# Patient Record
Sex: MALE | Race: WHITE | NOT HISPANIC OR LATINO | Employment: STUDENT | URBAN - METROPOLITAN AREA
[De-identification: names, ages, dates, MRNs, and addresses within clinical notes are randomized per-mention and may not be internally consistent; named-entity substitution may affect disease eponyms.]

---

## 2023-04-21 ENCOUNTER — HOSPITAL ENCOUNTER (EMERGENCY)
Age: 23
Discharge: HOME | End: 2023-04-22
Attending: EMERGENCY MEDICINE

## 2023-04-21 ENCOUNTER — APPOINTMENT (OUTPATIENT)
Dept: GENERAL RADIOLOGY | Age: 23
End: 2023-04-21
Attending: EMERGENCY MEDICINE

## 2023-04-21 ENCOUNTER — APPOINTMENT (OUTPATIENT)
Dept: CT IMAGING | Age: 23
End: 2023-04-21
Attending: EMERGENCY MEDICINE

## 2023-04-21 DIAGNOSIS — R55 SYNCOPE AND COLLAPSE: Primary | ICD-10-CM

## 2023-04-21 LAB
ALBUMIN SERPL-MCNC: 4.1 G/DL (ref 3.6–5.1)
ALBUMIN/GLOB SERPL: 1.6 {RATIO} (ref 1–2.4)
ALP SERPL-CCNC: 47 UNITS/L (ref 45–117)
ALT SERPL-CCNC: 16 UNITS/L
ANION GAP SERPL CALC-SCNC: 7 MMOL/L (ref 7–19)
AST SERPL-CCNC: 15 UNITS/L
BASOPHILS # BLD: 0 K/MCL (ref 0–0.3)
BASOPHILS NFR BLD: 0 %
BILIRUB SERPL-MCNC: 0.9 MG/DL (ref 0.2–1)
BUN SERPL-MCNC: 5 MG/DL (ref 6–20)
BUN/CREAT SERPL: 5 (ref 7–25)
CALCIUM SERPL-MCNC: 8.9 MG/DL (ref 8.4–10.2)
CHLORIDE SERPL-SCNC: 107 MMOL/L (ref 97–110)
CO2 SERPL-SCNC: 26 MMOL/L (ref 21–32)
CREAT SERPL-MCNC: 1.03 MG/DL (ref 0.67–1.17)
DEPRECATED RDW RBC: 35.6 FL (ref 39–50)
EOSINOPHIL # BLD: 0.1 K/MCL (ref 0–0.5)
EOSINOPHIL NFR BLD: 2 %
ERYTHROCYTE [DISTWIDTH] IN BLOOD: 11.8 % (ref 11–15)
ETHANOL SERPL-MCNC: NORMAL MG/DL
FASTING DURATION TIME PATIENT: ABNORMAL H
GFR SERPLBLD BASED ON 1.73 SQ M-ARVRAT: >90 ML/MIN
GLOBULIN SER-MCNC: 2.5 G/DL (ref 2–4)
GLUCOSE SERPL-MCNC: 115 MG/DL (ref 70–99)
HCT VFR BLD CALC: 44.8 % (ref 39–51)
HGB BLD-MCNC: 15.8 G/DL (ref 13–17)
IMM GRANULOCYTES # BLD AUTO: 0 K/MCL (ref 0–0.2)
IMM GRANULOCYTES # BLD: 0 %
LYMPHOCYTES # BLD: 2.4 K/MCL (ref 1–4.8)
LYMPHOCYTES NFR BLD: 41 %
MAGNESIUM SERPL-MCNC: 1.9 MG/DL (ref 1.7–2.4)
MCH RBC QN AUTO: 29.4 PG (ref 26–34)
MCHC RBC AUTO-ENTMCNC: 35.3 G/DL (ref 32–36.5)
MCV RBC AUTO: 83.3 FL (ref 78–100)
MONOCYTES # BLD: 0.5 K/MCL (ref 0.3–0.9)
MONOCYTES NFR BLD: 8 %
NEUTROPHILS # BLD: 2.8 K/MCL (ref 1.8–7.7)
NEUTROPHILS NFR BLD: 49 %
NRBC BLD MANUAL-RTO: 0 /100 WBC
PLATELET # BLD AUTO: 166 K/MCL (ref 140–450)
POTASSIUM SERPL-SCNC: 3.2 MMOL/L (ref 3.4–5.1)
PROT SERPL-MCNC: 6.6 G/DL (ref 6.4–8.2)
RBC # BLD: 5.38 MIL/MCL (ref 4.5–5.9)
SODIUM SERPL-SCNC: 137 MMOL/L (ref 135–145)
TROPONIN I SERPL DL<=0.01 NG/ML-MCNC: 6 NG/L
WBC # BLD: 5.8 K/MCL (ref 4.2–11)

## 2023-04-21 PROCEDURE — 72125 CT NECK SPINE W/O DYE: CPT

## 2023-04-21 PROCEDURE — 93005 ELECTROCARDIOGRAM TRACING: CPT | Performed by: EMERGENCY MEDICINE

## 2023-04-21 PROCEDURE — 70450 CT HEAD/BRAIN W/O DYE: CPT

## 2023-04-21 PROCEDURE — 84484 ASSAY OF TROPONIN QUANT: CPT | Performed by: EMERGENCY MEDICINE

## 2023-04-21 PROCEDURE — 80053 COMPREHEN METABOLIC PANEL: CPT | Performed by: EMERGENCY MEDICINE

## 2023-04-21 PROCEDURE — 99285 EMERGENCY DEPT VISIT HI MDM: CPT

## 2023-04-21 PROCEDURE — 96361 HYDRATE IV INFUSION ADD-ON: CPT

## 2023-04-21 PROCEDURE — 83735 ASSAY OF MAGNESIUM: CPT | Performed by: EMERGENCY MEDICINE

## 2023-04-21 PROCEDURE — G1004 CDSM NDSC: HCPCS

## 2023-04-21 PROCEDURE — 96374 THER/PROPH/DIAG INJ IV PUSH: CPT

## 2023-04-21 PROCEDURE — 10002807 HB RX 258: Performed by: EMERGENCY MEDICINE

## 2023-04-21 PROCEDURE — 10002800 HB RX 250 W HCPCS: Performed by: EMERGENCY MEDICINE

## 2023-04-21 PROCEDURE — 85025 COMPLETE CBC W/AUTO DIFF WBC: CPT | Performed by: EMERGENCY MEDICINE

## 2023-04-21 PROCEDURE — 82077 ASSAY SPEC XCP UR&BREATH IA: CPT | Performed by: EMERGENCY MEDICINE

## 2023-04-21 RX ORDER — POTASSIUM CHLORIDE 20 MEQ/1
20 TABLET, EXTENDED RELEASE ORAL ONCE
Status: COMPLETED | OUTPATIENT
Start: 2023-04-21 | End: 2023-04-22

## 2023-04-21 RX ORDER — ONDANSETRON 2 MG/ML
4 INJECTION INTRAMUSCULAR; INTRAVENOUS ONCE
Status: COMPLETED | OUTPATIENT
Start: 2023-04-21 | End: 2023-04-21

## 2023-04-21 RX ADMIN — SODIUM CHLORIDE, POTASSIUM CHLORIDE, SODIUM LACTATE AND CALCIUM CHLORIDE 1000 ML: 600; 310; 30; 20 INJECTION, SOLUTION INTRAVENOUS at 23:21

## 2023-04-21 RX ADMIN — ONDANSETRON 4 MG: 2 INJECTION INTRAMUSCULAR; INTRAVENOUS at 22:04

## 2023-04-21 ASSESSMENT — PAIN SCALES - GENERAL: PAINLEVEL_OUTOF10: 4

## 2023-04-21 ASSESSMENT — PAIN DESCRIPTION - PAIN TYPE: TYPE: ACUTE PAIN

## 2023-04-22 ENCOUNTER — APPOINTMENT (OUTPATIENT)
Dept: GENERAL RADIOLOGY | Age: 23
End: 2023-04-22
Attending: EMERGENCY MEDICINE

## 2023-04-22 VITALS
HEART RATE: 51 BPM | TEMPERATURE: 98.3 F | SYSTOLIC BLOOD PRESSURE: 108 MMHG | HEIGHT: 76 IN | RESPIRATION RATE: 18 BRPM | DIASTOLIC BLOOD PRESSURE: 57 MMHG | OXYGEN SATURATION: 96 % | WEIGHT: 189.6 LBS | BODY MASS INDEX: 23.09 KG/M2

## 2023-04-22 LAB
ATRIAL RATE (BPM): 64
P AXIS (DEGREES): 29
PR-INTERVAL (MSEC): 142
QRS-INTERVAL (MSEC): 124
QT-INTERVAL (MSEC): 380
QTC: 392
R AXIS (DEGREES): 56
RAINBOW EXTRA TUBES HOLD SPECIMEN: NORMAL
REPORT TEXT: NORMAL
T AXIS (DEGREES): 57
VENTRICULAR RATE EKG/MIN (BPM): 64

## 2023-04-22 PROCEDURE — 10002803 HB RX 637: Performed by: EMERGENCY MEDICINE

## 2023-04-22 PROCEDURE — 71045 X-RAY EXAM CHEST 1 VIEW: CPT

## 2023-04-22 RX ADMIN — POTASSIUM CHLORIDE 20 MEQ: 1500 TABLET, EXTENDED RELEASE ORAL at 00:19

## 2025-01-02 ENCOUNTER — HOSPITAL ENCOUNTER (EMERGENCY)
Age: 25
Discharge: HOME | End: 2025-01-02
Attending: EMERGENCY MEDICINE
Payer: MEDICAID

## 2025-01-02 VITALS
SYSTOLIC BLOOD PRESSURE: 134 MMHG | OXYGEN SATURATION: 99 % | HEIGHT: 74 IN | HEART RATE: 102 BPM | DIASTOLIC BLOOD PRESSURE: 89 MMHG | RESPIRATION RATE: 16 BRPM | BODY MASS INDEX: 24.38 KG/M2 | TEMPERATURE: 99 F | WEIGHT: 190 LBS

## 2025-01-02 DIAGNOSIS — R11.2 NAUSEA AND VOMITING, UNSPECIFIED VOMITING TYPE: Primary | ICD-10-CM

## 2025-01-02 LAB
POCT INFLUENZA A: NEGATIVE
POCT INFLUENZA B: NEGATIVE
SARS-COV-2 RNA RESP QL NAA+PROBE: NOT DETECTED

## 2025-01-02 PROCEDURE — 99284 EMERGENCY DEPT VISIT MOD MDM: CPT

## 2025-01-02 PROCEDURE — 87502 INFLUENZA DNA AMP PROBE: CPT | Performed by: EMERGENCY MEDICINE

## 2025-01-02 PROCEDURE — 99283 EMERGENCY DEPT VISIT LOW MDM: CPT

## 2025-01-02 RX ORDER — ACETAMINOPHEN 500 MG
1000 TABLET ORAL ONCE
Status: COMPLETED | OUTPATIENT
Start: 2025-01-02 | End: 2025-01-02

## 2025-01-02 RX ORDER — ONDANSETRON 4 MG/1
4 TABLET, ORALLY DISINTEGRATING ORAL EVERY 4 HOURS PRN
Qty: 7 TABLET | Refills: 0 | Status: SHIPPED | OUTPATIENT
Start: 2025-01-02

## 2025-01-02 RX ORDER — ONDANSETRON 4 MG/1
4 TABLET, ORALLY DISINTEGRATING ORAL ONCE
Status: COMPLETED | OUTPATIENT
Start: 2025-01-02 | End: 2025-01-02

## 2025-01-02 NOTE — ED PROVIDER NOTES
Patient Seen in: [facility] [place]      History     Chief Complaint   Patient presents with    Nausea/vomiting     Stated Complaint: woke up this morning with nausea/vomiting. around sick contacts    Subjective:   HPI  Patient is a 24-year-old male otherwise healthy who presents to the ED for evaluation of nausea and vomiting starting this morning around 5 AM.  Patient reports 4 episodes of NBNB emesis and some mild epigastric discomfort.  Patient states that he has had congestion as well.  Reports subjective fever but did not take his temperature.  Did not take any medicines prior to arrival.  Denies any cough, chest pain or SOB, diarrhea, constipation, urinary symptoms.  Patient's last bowel movement was yesterday and was normal.  Patient is accompanied by his sister who tested positive for influenza A.      Objective:     History reviewed. No pertinent past medical history.           History reviewed. No pertinent surgical history.             Social History     Socioeconomic History    Marital status: Single    Number of children: 0   Occupational History    Occupation: /   Tobacco Use    Smoking status: Former     Current packs/day: 0.00     Average packs/day: 1 pack/day for 5.0 years (5.0 ttl pk-yrs)     Types: Cigarettes     Start date: 10/6/2014     Quit date: 10/6/2019     Years since quittin.2    Smokeless tobacco: Never   Vaping Use    Vaping status: Every Day    Substances: Nicotine    Devices: Pre-filled or refillable cartridge, Refillable tank, Pre-filled pod   Substance and Sexual Activity    Alcohol use: Never     Alcohol/week: 0.0 standard drinks of alcohol    Drug use: Never                  Physical Exam     ED Triage Vitals [25 0744]   /79   Pulse 119   Resp 18   Temp 99.4 °F (37.4 °C)   Temp src Temporal   SpO2 97 %   O2 Device None (Room air)       Current Vitals:   Vital Signs  BP: 134/89  Pulse: 102  Resp: 16  Temp: 99.4 °F (37.4  °C)  Temp src: Temporal    Oxygen Therapy  SpO2: 99 %  O2 Device: None (Room air)        Physical Exam  Vitals and nursing note reviewed.   Constitutional:       General: He is not in acute distress.     Appearance: He is not ill-appearing.   HENT:      Head: Normocephalic and atraumatic.      Nose: Nose normal.      Mouth/Throat:      Mouth: Mucous membranes are moist.   Eyes:      Extraocular Movements: Extraocular movements intact.      Pupils: Pupils are equal, round, and reactive to light.   Cardiovascular:      Rate and Rhythm: Normal rate and regular rhythm.      Pulses: Normal pulses.   Pulmonary:      Effort: Pulmonary effort is normal. No respiratory distress.      Breath sounds: No wheezing.   Abdominal:      General: There is no distension.      Palpations: Abdomen is soft.      Tenderness: There is abdominal tenderness.      Comments: Mild epigastric TTP   Musculoskeletal:         General: No swelling. Normal range of motion.      Cervical back: Normal range of motion.   Skin:     General: Skin is warm and dry.      Capillary Refill: Capillary refill takes less than 2 seconds.   Neurological:      General: No focal deficit present.      Mental Status: He is alert.   Psychiatric:         Mood and Affect: Mood normal.           ED Course     Labs Reviewed   RAPID SARS-COV-2 BY PCR - Normal   POCT FLU TEST - Normal    Narrative:     This assay is a rapid molecular in vitro test utilizing nucleic acid amplification of influenza A and B viral RNA.       ED Course as of 01/02/25 1718  ------------------------------------------------------------  Time: 01/02 0819  Comment: Covid, flu negative. Pt re-evaluated. VSS. Feeling better and tolerating PO. Patient is stable for discharge home with close PCP f/u. Discussed strict return precautions and supportive care. Patient verbalized understanding and agreement of plan.   Prescribed zofran.             MDM      Patient is a 24-year-old male otherwise healthy who  presents to the ED for evaluation of nausea and vomiting starting this morning around 5 AM.  Patient's temp is 99.4, he is mildly tachycardic to 119, satting well on room air.  On exam patient is nontoxic-appearing.  He appears mildly dehydrated.  He has mild epigastric tenderness but no peritoneal signs.  Suspect most likely gastroenteritis or viral syndrome with history and presentation. Low suspicion for SBO, appendicitis, UTI/pyelo, pancreatitis, GB etiology or bacterial infection with history, presentation exam at this time.  Will plan to p.o. challenge after Zofran and give Tylenol.  Will obtain COVID and flu swabs.    ED Course as of 01/02/25 1718  ------------------------------------------------------------  Time: 01/02 0819  Comment: Covid, flu negative. Pt re-evaluated. VSS. Feeling better and tolerating PO. Patient is stable for discharge home with close PCP f/u. Discussed strict return precautions and supportive care. Patient verbalized understanding and agreement of plan.   Prescribed zofran.              Medical Decision Making      Disposition and Plan     Clinical Impression:  1. Nausea and vomiting, unspecified vomiting type         Disposition:  Discharge  1/2/2025  9:31 am    Follow-up:  Freddy Hernández MD  [address]   [address]  [facility] [address]  [phone]    Schedule an appointment as soon as possible for a visit in 1 day(s)            Medications Prescribed:  Discharge Medication List as of 1/2/2025  9:31 AM        START taking these medications    Details   ondansetron 4 MG Oral Tablet Dispersible Take 1 tablet (4 mg total) by mouth every 4 (four) hours as needed for Nausea., Normal, Disp-7 tablet, R-0                 Supplementary Documentation:

## 2025-01-02 NOTE — DISCHARGE INSTRUCTIONS
You were seen in the emergency department.  He tested negative for COVID and for flu.  Take Zofran as needed every 8 hours for vomiting or nausea.  You can also take Tylenol and ibuprofen as needed for fevers or pain.  Return to the ER if you develop persistent fevers, worsening pain, if you are vomiting and unable to keep anything down, bloody vomit or any other new concerns or worsening symptoms.  Please follow closely with your primary care physician for reevaluation.  Stay hydrated with plenty of fluids.

## 2025-01-02 NOTE — ED INITIAL ASSESSMENT (HPI)
Pt to ed with c/o n/v and fever this AM. PT reports approx 4 episodes of vomiting. Denies diarrhea

## 2025-06-25 ENCOUNTER — APPOINTMENT (OUTPATIENT)
Dept: CT IMAGING | Age: 25
End: 2025-06-25
Attending: EMERGENCY MEDICINE
Payer: MEDICAID

## 2025-06-25 ENCOUNTER — HOSPITAL ENCOUNTER (OUTPATIENT)
Facility: HOSPITAL | Age: 25
Setting detail: OBSERVATION
Discharge: HOME | End: 2025-06-27
Attending: EMERGENCY MEDICINE | Admitting: STUDENT IN AN ORGANIZED HEALTH CARE EDUCATION/TRAINING PROGRAM
Payer: MEDICAID

## 2025-06-25 DIAGNOSIS — K35.890 OTHER ACUTE APPENDICITIS: Primary | ICD-10-CM

## 2025-06-25 DIAGNOSIS — K35.80 ACUTE APPENDICITIS: ICD-10-CM

## 2025-06-25 DIAGNOSIS — G89.18 POSTOPERATIVE PAIN: ICD-10-CM

## 2025-06-25 LAB
ALBUMIN SERPL-MCNC: 4.5 G/DL (ref 3.2–4.8)
ALBUMIN/GLOB SERPL: 2 {RATIO} (ref 1–2)
ALP LIVER SERPL-CCNC: 52 U/L (ref 45–117)
ALT SERPL-CCNC: 28 U/L (ref 10–49)
ANION GAP SERPL CALC-SCNC: 8 MMOL/L (ref 0–18)
AST SERPL-CCNC: 14 U/L (ref ?–34)
BASOPHILS # BLD AUTO: 0.02 X10(3) UL (ref 0–0.2)
BASOPHILS NFR BLD AUTO: 0.3 %
BILIRUB SERPL-MCNC: 0.6 MG/DL (ref 0.3–1.2)
BILIRUB UR QL STRIP.AUTO: NEGATIVE
BUN BLD-MCNC: 9 MG/DL (ref 9–23)
CALCIUM BLD-MCNC: 9.3 MG/DL (ref 8.7–10.6)
CHLORIDE SERPL-SCNC: 103 MMOL/L (ref 98–112)
CLARITY UR REFRACT.AUTO: CLEAR
CO2 SERPL-SCNC: 28 MMOL/L (ref 21–32)
COLOR UR AUTO: YELLOW
CREAT BLD-MCNC: 1.12 MG/DL (ref 0.7–1.3)
EGFRCR SERPLBLD CKD-EPI 2021: 93 ML/MIN/1.73M2 (ref 60–?)
EOSINOPHIL # BLD AUTO: 0.1 X10(3) UL (ref 0–0.7)
EOSINOPHIL NFR BLD AUTO: 1.6 %
ERYTHROCYTE [DISTWIDTH] IN BLOOD BY AUTOMATED COUNT: 12.3 %
GLOBULIN PLAS-MCNC: 2.3 G/DL (ref 2–3.5)
GLUCOSE BLD-MCNC: 100 MG/DL (ref 70–99)
GLUCOSE UR STRIP.AUTO-MCNC: NEGATIVE MG/DL
HCT VFR BLD AUTO: 47.1 % (ref 39–53)
HGB BLD-MCNC: 16.5 G/DL (ref 13–17.5)
IMM GRANULOCYTES # BLD AUTO: 0.02 X10(3) UL (ref 0–1)
IMM GRANULOCYTES NFR BLD: 0.3 %
KETONES UR STRIP.AUTO-MCNC: NEGATIVE MG/DL
LEUKOCYTE ESTERASE UR QL STRIP.AUTO: NEGATIVE
LIPASE SERPL-CCNC: 25 U/L (ref 12–53)
LYMPHOCYTES # BLD AUTO: 1.4 X10(3) UL (ref 1–4)
LYMPHOCYTES NFR BLD AUTO: 22.9 %
MCH RBC QN AUTO: 29.7 PG (ref 26–34)
MCHC RBC AUTO-ENTMCNC: 35 G/DL (ref 31–37)
MCV RBC AUTO: 84.9 FL (ref 80–100)
MONOCYTES # BLD AUTO: 0.5 X10(3) UL (ref 0.1–1)
MONOCYTES NFR BLD AUTO: 8.2 %
NEUTROPHILS # BLD AUTO: 4.08 X10 (3) UL (ref 1.5–7.7)
NEUTROPHILS # BLD AUTO: 4.08 X10(3) UL (ref 1.5–7.7)
NEUTROPHILS NFR BLD AUTO: 66.7 %
NITRITE UR QL STRIP.AUTO: NEGATIVE
OSMOLALITY SERPL CALC.SUM OF ELEC: 287 MOSM/KG (ref 275–295)
PH UR STRIP.AUTO: 5.5 [PH] (ref 5–8)
PLATELET # BLD AUTO: 183 10(3)UL (ref 150–450)
POTASSIUM SERPL-SCNC: 3.5 MMOL/L (ref 3.5–5.1)
PROT SERPL-MCNC: 6.8 G/DL (ref 5.7–8.2)
PROT UR STRIP.AUTO-MCNC: NEGATIVE MG/DL
RBC # BLD AUTO: 5.55 X10(6)UL (ref 4.3–5.7)
RBC UR QL AUTO: NEGATIVE
SODIUM SERPL-SCNC: 139 MMOL/L (ref 136–145)
SP GR UR STRIP.AUTO: 1.01 (ref 1–1.03)
UROBILINOGEN UR STRIP.AUTO-MCNC: 0.2 MG/DL
WBC # BLD AUTO: 6.1 X10(3) UL (ref 4–11)

## 2025-06-25 PROCEDURE — 74177 CT ABD & PELVIS W/CONTRAST: CPT | Performed by: EMERGENCY MEDICINE

## 2025-06-25 RX ORDER — ONDANSETRON 2 MG/ML
4 INJECTION INTRAMUSCULAR; INTRAVENOUS ONCE
Status: COMPLETED | OUTPATIENT
Start: 2025-06-25 | End: 2025-06-25

## 2025-06-25 RX ORDER — METRONIDAZOLE 500 MG/100ML
500 INJECTION, SOLUTION INTRAVENOUS EVERY 12 HOURS
Status: DISCONTINUED | OUTPATIENT
Start: 2025-06-25 | End: 2025-06-27

## 2025-06-25 RX ORDER — OXYCODONE HYDROCHLORIDE 10 MG/1
10 TABLET ORAL EVERY 4 HOURS PRN
Status: DISCONTINUED | OUTPATIENT
Start: 2025-06-25 | End: 2025-06-26

## 2025-06-25 RX ORDER — ACETAMINOPHEN 500 MG
1000 TABLET ORAL EVERY 8 HOURS SCHEDULED
Status: DISCONTINUED | OUTPATIENT
Start: 2025-06-25 | End: 2025-06-27

## 2025-06-25 RX ORDER — SODIUM CHLORIDE 9 MG/ML
INJECTION, SOLUTION INTRAVENOUS CONTINUOUS
Status: DISCONTINUED | OUTPATIENT
Start: 2025-06-25 | End: 2025-06-27

## 2025-06-25 RX ORDER — ENOXAPARIN SODIUM 100 MG/ML
40 INJECTION SUBCUTANEOUS DAILY
Status: DISCONTINUED | OUTPATIENT
Start: 2025-06-26 | End: 2025-06-26

## 2025-06-25 RX ORDER — PROCHLORPERAZINE EDISYLATE 5 MG/ML
5 INJECTION INTRAMUSCULAR; INTRAVENOUS EVERY 8 HOURS PRN
Status: DISCONTINUED | OUTPATIENT
Start: 2025-06-25 | End: 2025-06-27

## 2025-06-25 RX ORDER — OXYCODONE HYDROCHLORIDE 5 MG/1
5 TABLET ORAL EVERY 4 HOURS PRN
Status: DISCONTINUED | OUTPATIENT
Start: 2025-06-25 | End: 2025-06-27

## 2025-06-25 RX ORDER — SODIUM PHOSPHATE, DIBASIC AND SODIUM PHOSPHATE, MONOBASIC 7; 19 G/230ML; G/230ML
1 ENEMA RECTAL ONCE AS NEEDED
Status: DISCONTINUED | OUTPATIENT
Start: 2025-06-25 | End: 2025-06-27

## 2025-06-25 RX ORDER — ONDANSETRON 2 MG/ML
4 INJECTION INTRAMUSCULAR; INTRAVENOUS EVERY 6 HOURS PRN
Status: DISCONTINUED | OUTPATIENT
Start: 2025-06-25 | End: 2025-06-27

## 2025-06-25 RX ORDER — METRONIDAZOLE 500 MG/100ML
500 INJECTION, SOLUTION INTRAVENOUS ONCE
Status: DISCONTINUED | OUTPATIENT
Start: 2025-06-25 | End: 2025-06-25

## 2025-06-25 RX ORDER — SENNOSIDES 8.6 MG
17.2 TABLET ORAL NIGHTLY PRN
Status: DISCONTINUED | OUTPATIENT
Start: 2025-06-25 | End: 2025-06-27

## 2025-06-25 RX ORDER — HYDROMORPHONE HYDROCHLORIDE 1 MG/ML
0.5 INJECTION, SOLUTION INTRAMUSCULAR; INTRAVENOUS; SUBCUTANEOUS ONCE
Refills: 0 | Status: COMPLETED | OUTPATIENT
Start: 2025-06-25 | End: 2025-06-25

## 2025-06-25 RX ORDER — DEXTROSE MONOHYDRATE AND SODIUM CHLORIDE 5; .45 G/100ML; G/100ML
INJECTION, SOLUTION INTRAVENOUS CONTINUOUS
Status: ACTIVE | OUTPATIENT
Start: 2025-06-25 | End: 2025-06-25

## 2025-06-25 RX ORDER — HYDROMORPHONE HYDROCHLORIDE 1 MG/ML
0.5 INJECTION, SOLUTION INTRAMUSCULAR; INTRAVENOUS; SUBCUTANEOUS EVERY 30 MIN PRN
Status: DISCONTINUED | OUTPATIENT
Start: 2025-06-25 | End: 2025-06-25

## 2025-06-25 RX ORDER — HYDROMORPHONE HYDROCHLORIDE 1 MG/ML
0.8 INJECTION, SOLUTION INTRAMUSCULAR; INTRAVENOUS; SUBCUTANEOUS EVERY 2 HOUR PRN
Status: DISCONTINUED | OUTPATIENT
Start: 2025-06-25 | End: 2025-06-27

## 2025-06-25 RX ORDER — POLYETHYLENE GLYCOL 3350 17 G/17G
17 POWDER, FOR SOLUTION ORAL DAILY PRN
Status: DISCONTINUED | OUTPATIENT
Start: 2025-06-25 | End: 2025-06-27

## 2025-06-25 RX ORDER — BISACODYL 10 MG
10 SUPPOSITORY, RECTAL RECTAL
Status: DISCONTINUED | OUTPATIENT
Start: 2025-06-25 | End: 2025-06-27

## 2025-06-25 RX ORDER — ONDANSETRON 2 MG/ML
4 INJECTION INTRAMUSCULAR; INTRAVENOUS EVERY 4 HOURS PRN
Status: DISCONTINUED | OUTPATIENT
Start: 2025-06-25 | End: 2025-06-25

## 2025-06-25 RX ORDER — HYDROMORPHONE HYDROCHLORIDE 1 MG/ML
0.4 INJECTION, SOLUTION INTRAMUSCULAR; INTRAVENOUS; SUBCUTANEOUS EVERY 2 HOUR PRN
Status: DISCONTINUED | OUTPATIENT
Start: 2025-06-25 | End: 2025-06-27

## 2025-06-25 NOTE — ED PROVIDER NOTES
Patient Seen in: [facility] [place]        History  Chief Complaint   Patient presents with    Abdomen/Flank Pain     Stated Complaint: Abdominal pain    Subjective:   HPI            24-year-old male comes to the emergency department complaining of generalized abdominal pain which started around 1 PM today.  Pain is 9/10 in severity and is constant.  En route to the hospital the patient stopped at [employer] and ate a sandwich at 3 PM before presenting here.  He states that the food intake made his pain worse.  Some nausea but no vomiting.  No unusual food ingestions.  No known injury.  Last bowel movement was earlier today and was normal.  No urinary complaints.  No analgesic use.  No prior abdominal surgeries.      Objective:     History reviewed. No pertinent past medical history.           History reviewed. No pertinent surgical history.             Social History     Socioeconomic History    Marital status: Single   Tobacco Use    Smoking status: Never    Smokeless tobacco: Never   Vaping Use    Vaping status: Every Day                                Physical Exam    ED Triage Vitals [06/25/25 1549]   /86   Pulse 80   Resp 16   Temp 97.9 °F (36.6 °C)   Temp src Temporal   SpO2 96 %   O2 Device None (Room air)       Current Vitals:   Vital Signs  BP: (!) 131/93  Pulse: 78  Resp: 16  Temp: 98.2 °F (36.8 °C)  Temp src: Oral    Oxygen Therapy  SpO2: 99 %  O2 Device: None (Room air)            Physical Exam     General Appearance: This is a young adult male sitting on a gurney.  Vital signs were reviewed per nurses notes.  Patient is afebrile.  Heart rate and blood pressure within normal range.  HEENT: Normocephalic/atraumatic.  Anicteric sclera.  Oral mucosa is moist.  Oropharynx is normal.  Neck: No adenopathy or thyromegaly.  Lungs are clear to auscultation.  Heart exam: Normal S1-S2 without extra sounds or murmurs.  Regular rate and rhythm.  Abdomen: NABS.  Flat, soft with  moderate right lower quadrant tenderness without masses rebound or guarding.  Skin is dry without rashes or lesions.  Extremities are atraumatic.  Neuroexam: Awake, conversive and moving all 4 extremities well.      ED Course  Labs Reviewed   COMP METABOLIC PANEL (14) - Abnormal; Notable for the following components:       Result Value    Glucose 100 (*)     All other components within normal limits   LIPASE - Normal   URINALYSIS WITH CULTURE REFLEX   CBC WITH DIFFERENTIAL WITH PLATELET   RAINBOW DRAW LAVENDER          Intravenous access was obtained.  Laboratory studies were drawn.  Patient was kept NPO.    CT APPENDIX ABD/PEL W CONTRAST (CPT=74177)  Result Date: 6/25/2025  PROCEDURE: CT APPENDIX ABD/PEL W CONTRAST (CPT=74177) INDICATIONS: eval for appendicitis COMPARISON: No comparisons. TECHNIQUE: CT imaging of the abdomen and pelvis was performed with intravenous contrast material. Automated exposure control techniques for dose reduction were used, adjustment of the mA and/or kV were based on patient's size. Use of iterative reconstruction technique for dose reduction was used. Dose information is transmitted to the ACR (American College of Radiology) NRDR (National Radiology Data Registry) which includes the Dose Index Registry. CONTRAST: IOPAMIDOL 76% IV SOLN FOR POWER INJECTOR:100 mL FINDINGS: LIVER: No enlargement, atrophy, abnormal density, or significant focal lesion. BILIARY: No visible dilatation or calcification. PANCREAS: No lesion, fluid collection, ductal dilatation, or atrophy. SPLEEN: No enlargement or focal lesion. KIDNEYS: Normal. No mass, obstruction, or calcification. ADRENALS: No mass or enlargement. AORTA/VASCULAR: Normal. No aneurysm or dissection. RETROPERITONEUM: No mass or enlarged adenopathy. BOWEL/MESENTERY: 12 mm appendix with wall thickening diffusely of the appendix measuring 4 to 5 mm consistent with acute appendicitis. There is mild stranding around the appendix also consistent  with acute appendicitis. There is no abscess or free air. ABDOMINAL WALL: Normal. No mass or hernia. URINARY BLADDER: Normal. No visible focal wall thickening, lesion, or calculus. PELVIC NODES: Normal. No adenopathy. PELVIC ORGANS: Normal. No visible mass. Pelvic organs appropriate for patient age. BONES: Normal. No bony lesion or fracture. LUNG BASES: No visible pulmonary or pleural disease. OTHER: Negative.     CONCLUSION: Findings of acute appendicitis without abscess. Findings discussed with Dr. Gamboa. Electronically Verified and Signed by Attending Radiologist: Michael Mata MD 6/25/2025 6:52 PM Workstation: [id number]    I personally reviewed the images myself and went over results with patient.    I viewed the CT appendix films myself and there is evidence of acute appendicitis.  No abscess was noted.    Test results and treatment plan were discussed with the patient.  Case was discussed via [employer] with Dr. Lalo Avilez of general surgery.  He recommended initiating antibiotics.  Rocephin and Flagyl were ordered.  Patient was kept NPO.  He will be transferred to [facility] for bed placement tonight with operative care scheduled tomorrow.                  MDM     #1.  Abdominal pain secondary to acute appendicitis.  Confirmed by imaging findings.  No evidence of abscess.  No other intra-abdominal or retroperitoneal pathology such as diverticulitis, kidney stone or urinary tract infection.    Admission disposition: 6/25/2025  7:06 PM           Medical Decision Making      Disposition and Plan     Clinical Impression:  1. Other acute appendicitis         Disposition:  Admit  6/25/2025  7:06 pm    Follow-up:  No follow-up provider specified.        Medications Prescribed:  There are no discharge medications for this patient.            Supplementary Documentation:         Hospital Problems       Present on Admission           ICD-10-CM Noted POA    * (Principal) Other acute appendicitis K35.890  6/25/2025 Unknown

## 2025-06-26 ENCOUNTER — ANESTHESIA EVENT (OUTPATIENT)
Dept: SURGERY | Facility: HOSPITAL | Age: 25
End: 2025-06-26
Payer: MEDICAID

## 2025-06-26 ENCOUNTER — ANESTHESIA (OUTPATIENT)
Dept: SURGERY | Facility: HOSPITAL | Age: 25
End: 2025-06-26
Payer: MEDICAID

## 2025-06-26 PROBLEM — K35.30 ACUTE APPENDICITIS WITH LOCALIZED PERITONITIS, WITHOUT PERFORATION, ABSCESS, OR GANGRENE: Status: ACTIVE | Noted: 2025-06-26

## 2025-06-26 LAB
ANION GAP SERPL CALC-SCNC: 8 MMOL/L (ref 0–18)
BASOPHILS # BLD AUTO: 0.01 X10(3) UL (ref 0–0.2)
BASOPHILS NFR BLD AUTO: 0.2 %
BUN BLD-MCNC: 8 MG/DL (ref 9–23)
CALCIUM BLD-MCNC: 8.7 MG/DL (ref 8.7–10.6)
CHLORIDE SERPL-SCNC: 106 MMOL/L (ref 98–112)
CO2 SERPL-SCNC: 28 MMOL/L (ref 21–32)
CREAT BLD-MCNC: 1.06 MG/DL (ref 0.7–1.3)
EGFRCR SERPLBLD CKD-EPI 2021: 99 ML/MIN/1.73M2 (ref 60–?)
EOSINOPHIL # BLD AUTO: 0.16 X10(3) UL (ref 0–0.7)
EOSINOPHIL NFR BLD AUTO: 2.6 %
ERYTHROCYTE [DISTWIDTH] IN BLOOD BY AUTOMATED COUNT: 12.4 %
GLUCOSE BLD-MCNC: 88 MG/DL (ref 70–99)
HCT VFR BLD AUTO: 43.2 % (ref 39–53)
HGB BLD-MCNC: 14.8 G/DL (ref 13–17.5)
IMM GRANULOCYTES # BLD AUTO: 0.01 X10(3) UL (ref 0–1)
IMM GRANULOCYTES NFR BLD: 0.2 %
LYMPHOCYTES # BLD AUTO: 1.79 X10(3) UL (ref 1–4)
LYMPHOCYTES NFR BLD AUTO: 29.1 %
MAGNESIUM SERPL-MCNC: 1.8 MG/DL (ref 1.6–2.6)
MCH RBC QN AUTO: 29.5 PG (ref 26–34)
MCHC RBC AUTO-ENTMCNC: 34.3 G/DL (ref 31–37)
MCV RBC AUTO: 86.1 FL (ref 80–100)
MONOCYTES # BLD AUTO: 0.73 X10(3) UL (ref 0.1–1)
MONOCYTES NFR BLD AUTO: 11.9 %
NEUTROPHILS # BLD AUTO: 3.46 X10 (3) UL (ref 1.5–7.7)
NEUTROPHILS # BLD AUTO: 3.46 X10(3) UL (ref 1.5–7.7)
NEUTROPHILS NFR BLD AUTO: 56 %
OSMOLALITY SERPL CALC.SUM OF ELEC: 292 MOSM/KG (ref 275–295)
PLATELET # BLD AUTO: 161 10(3)UL (ref 150–450)
POTASSIUM SERPL-SCNC: 3.9 MMOL/L (ref 3.5–5.1)
RBC # BLD AUTO: 5.02 X10(6)UL (ref 4.3–5.7)
SODIUM SERPL-SCNC: 142 MMOL/L (ref 136–145)
WBC # BLD AUTO: 6.2 X10(3) UL (ref 4–11)

## 2025-06-26 PROCEDURE — 44970 LAPAROSCOPY APPENDECTOMY: CPT | Performed by: SURGERY

## 2025-06-26 PROCEDURE — 99223 1ST HOSP IP/OBS HIGH 75: CPT | Performed by: SURGERY

## 2025-06-26 RX ORDER — HYDROMORPHONE HYDROCHLORIDE 1 MG/ML
0.6 INJECTION, SOLUTION INTRAMUSCULAR; INTRAVENOUS; SUBCUTANEOUS EVERY 5 MIN PRN
Status: DISCONTINUED | OUTPATIENT
Start: 2025-06-26 | End: 2025-06-26 | Stop reason: HOSPADM

## 2025-06-26 RX ORDER — HYDROCODONE BITARTRATE AND ACETAMINOPHEN 5; 325 MG/1; MG/1
2 TABLET ORAL ONCE AS NEEDED
Status: DISCONTINUED | OUTPATIENT
Start: 2025-06-26 | End: 2025-06-26 | Stop reason: HOSPADM

## 2025-06-26 RX ORDER — SODIUM CHLORIDE, SODIUM LACTATE, POTASSIUM CHLORIDE, CALCIUM CHLORIDE 600; 310; 30; 20 MG/100ML; MG/100ML; MG/100ML; MG/100ML
INJECTION, SOLUTION INTRAVENOUS CONTINUOUS PRN
Status: DISCONTINUED | OUTPATIENT
Start: 2025-06-26 | End: 2025-06-26 | Stop reason: SURG

## 2025-06-26 RX ORDER — ONDANSETRON 2 MG/ML
4 INJECTION INTRAMUSCULAR; INTRAVENOUS EVERY 6 HOURS PRN
Status: DISCONTINUED | OUTPATIENT
Start: 2025-06-26 | End: 2025-06-26 | Stop reason: HOSPADM

## 2025-06-26 RX ORDER — ACETAMINOPHEN 500 MG
1000 TABLET ORAL ONCE AS NEEDED
Status: DISCONTINUED | OUTPATIENT
Start: 2025-06-26 | End: 2025-06-26 | Stop reason: HOSPADM

## 2025-06-26 RX ORDER — GLYCOPYRROLATE 0.2 MG/ML
INJECTION, SOLUTION INTRAMUSCULAR; INTRAVENOUS AS NEEDED
Status: DISCONTINUED | OUTPATIENT
Start: 2025-06-26 | End: 2025-06-26 | Stop reason: SURG

## 2025-06-26 RX ORDER — MIDAZOLAM HYDROCHLORIDE 1 MG/ML
INJECTION INTRAMUSCULAR; INTRAVENOUS AS NEEDED
Status: DISCONTINUED | OUTPATIENT
Start: 2025-06-26 | End: 2025-06-26 | Stop reason: SURG

## 2025-06-26 RX ORDER — BUPIVACAINE HYDROCHLORIDE AND EPINEPHRINE 5; 5 MG/ML; UG/ML
INJECTION, SOLUTION EPIDURAL; INTRACAUDAL; PERINEURAL AS NEEDED
Status: DISCONTINUED | OUTPATIENT
Start: 2025-06-26 | End: 2025-06-26 | Stop reason: HOSPADM

## 2025-06-26 RX ORDER — OXYCODONE HYDROCHLORIDE 5 MG/1
5 TABLET ORAL EVERY 6 HOURS PRN
Qty: 12 TABLET | Refills: 0 | Status: SHIPPED | OUTPATIENT
Start: 2025-06-26 | End: 2025-06-27

## 2025-06-26 RX ORDER — HYDROMORPHONE HYDROCHLORIDE 1 MG/ML
0.4 INJECTION, SOLUTION INTRAMUSCULAR; INTRAVENOUS; SUBCUTANEOUS EVERY 5 MIN PRN
Status: DISCONTINUED | OUTPATIENT
Start: 2025-06-26 | End: 2025-06-26 | Stop reason: HOSPADM

## 2025-06-26 RX ORDER — SODIUM CHLORIDE, SODIUM LACTATE, POTASSIUM CHLORIDE, CALCIUM CHLORIDE 600; 310; 30; 20 MG/100ML; MG/100ML; MG/100ML; MG/100ML
INJECTION, SOLUTION INTRAVENOUS CONTINUOUS
Status: DISCONTINUED | OUTPATIENT
Start: 2025-06-26 | End: 2025-06-26 | Stop reason: HOSPADM

## 2025-06-26 RX ORDER — NALOXONE HYDROCHLORIDE 0.4 MG/ML
0.08 INJECTION, SOLUTION INTRAMUSCULAR; INTRAVENOUS; SUBCUTANEOUS AS NEEDED
Status: DISCONTINUED | OUTPATIENT
Start: 2025-06-26 | End: 2025-06-26 | Stop reason: HOSPADM

## 2025-06-26 RX ORDER — ONDANSETRON 2 MG/ML
INJECTION INTRAMUSCULAR; INTRAVENOUS AS NEEDED
Status: DISCONTINUED | OUTPATIENT
Start: 2025-06-26 | End: 2025-06-26 | Stop reason: SURG

## 2025-06-26 RX ORDER — PROCHLORPERAZINE EDISYLATE 5 MG/ML
5 INJECTION INTRAMUSCULAR; INTRAVENOUS EVERY 8 HOURS PRN
Status: DISCONTINUED | OUTPATIENT
Start: 2025-06-26 | End: 2025-06-26 | Stop reason: HOSPADM

## 2025-06-26 RX ORDER — NEOSTIGMINE METHYLSULFATE 1 MG/ML
INJECTION INTRAVENOUS AS NEEDED
Status: DISCONTINUED | OUTPATIENT
Start: 2025-06-26 | End: 2025-06-26 | Stop reason: SURG

## 2025-06-26 RX ORDER — HYDROCODONE BITARTRATE AND ACETAMINOPHEN 5; 325 MG/1; MG/1
1 TABLET ORAL ONCE AS NEEDED
Status: DISCONTINUED | OUTPATIENT
Start: 2025-06-26 | End: 2025-06-26 | Stop reason: HOSPADM

## 2025-06-26 RX ORDER — MAGNESIUM SULFATE HEPTAHYDRATE 40 MG/ML
2 INJECTION, SOLUTION INTRAVENOUS ONCE
Status: COMPLETED | OUTPATIENT
Start: 2025-06-26 | End: 2025-06-26

## 2025-06-26 RX ORDER — HYDROMORPHONE HYDROCHLORIDE 1 MG/ML
INJECTION, SOLUTION INTRAMUSCULAR; INTRAVENOUS; SUBCUTANEOUS
Status: COMPLETED
Start: 2025-06-26 | End: 2025-06-26

## 2025-06-26 RX ORDER — ENOXAPARIN SODIUM 100 MG/ML
40 INJECTION SUBCUTANEOUS DAILY
Status: DISCONTINUED | OUTPATIENT
Start: 2025-06-27 | End: 2025-06-27

## 2025-06-26 RX ORDER — HYDROMORPHONE HYDROCHLORIDE 1 MG/ML
0.2 INJECTION, SOLUTION INTRAMUSCULAR; INTRAVENOUS; SUBCUTANEOUS EVERY 5 MIN PRN
Status: DISCONTINUED | OUTPATIENT
Start: 2025-06-26 | End: 2025-06-26 | Stop reason: HOSPADM

## 2025-06-26 RX ORDER — DEXAMETHASONE SODIUM PHOSPHATE 4 MG/ML
VIAL (ML) INJECTION AS NEEDED
Status: DISCONTINUED | OUTPATIENT
Start: 2025-06-26 | End: 2025-06-26 | Stop reason: SURG

## 2025-06-26 RX ORDER — ROCURONIUM BROMIDE 10 MG/ML
INJECTION, SOLUTION INTRAVENOUS AS NEEDED
Status: DISCONTINUED | OUTPATIENT
Start: 2025-06-26 | End: 2025-06-26 | Stop reason: SURG

## 2025-06-26 RX ORDER — LIDOCAINE HYDROCHLORIDE 10 MG/ML
INJECTION, SOLUTION EPIDURAL; INFILTRATION; INTRACAUDAL; PERINEURAL AS NEEDED
Status: DISCONTINUED | OUTPATIENT
Start: 2025-06-26 | End: 2025-06-26 | Stop reason: SURG

## 2025-06-26 RX ADMIN — GLYCOPYRROLATE 0.8 MG: 0.2 INJECTION, SOLUTION INTRAMUSCULAR; INTRAVENOUS at 16:27:00

## 2025-06-26 RX ADMIN — MIDAZOLAM HYDROCHLORIDE 2 MG: 1 INJECTION INTRAMUSCULAR; INTRAVENOUS at 15:40:00

## 2025-06-26 RX ADMIN — ROCURONIUM BROMIDE 50 MG: 10 INJECTION, SOLUTION INTRAVENOUS at 15:47:00

## 2025-06-26 RX ADMIN — DEXAMETHASONE SODIUM PHOSPHATE 4 MG: 4 MG/ML VIAL (ML) INJECTION at 15:49:00

## 2025-06-26 RX ADMIN — SODIUM CHLORIDE, SODIUM LACTATE, POTASSIUM CHLORIDE, CALCIUM CHLORIDE: 600; 310; 30; 20 INJECTION, SOLUTION INTRAVENOUS at 15:37:00

## 2025-06-26 RX ADMIN — ONDANSETRON 4 MG: 2 INJECTION INTRAMUSCULAR; INTRAVENOUS at 16:31:00

## 2025-06-26 RX ADMIN — METRONIDAZOLE 500 MG: 500 INJECTION, SOLUTION INTRAVENOUS at 15:49:00

## 2025-06-26 RX ADMIN — NEOSTIGMINE METHYLSULFATE 5 MG: 1 INJECTION INTRAVENOUS at 16:27:00

## 2025-06-26 RX ADMIN — LIDOCAINE HYDROCHLORIDE 50 MG: 10 INJECTION, SOLUTION EPIDURAL; INFILTRATION; INTRACAUDAL; PERINEURAL at 15:47:00

## 2025-06-26 NOTE — ANESTHESIA POSTPROCEDURE EVALUATION
[facility]    Misha Lux Patient Status:  Observation   Age/Gender 24 year old male MRN [id number]   Location [facility] POST ANESTHESIA CARE UNIT Attending Daayna Eddy MD   Hosp Day # 0 PCP No primary care provider on file.       Anesthesia Post-op Note    LAPAROSCOPIC APPENDECTOMY    Procedure Summary       Date: 06/26/25 Room / Location:  MAIN OR 15 / [facility] MAIN OR    Anesthesia Start: 1537 Anesthesia Stop: 1656    Procedure: LAPAROSCOPIC APPENDECTOMY (Abdomen) Diagnosis:       Acute appendicitis      Localized peritonitis (HCC)      (Acute appendicitis [K35.80])    Surgeons: Dayana Eddy MD Anesthesiologist: Ray Valadez MD    Anesthesia Type: general ASA Status: 1            Anesthesia Type: general    Vitals Value Taken Time   /59 06/26/25 16:56   Temp 98.9 °F (37.2 °C) 06/26/25 16:56   Pulse 80 06/26/25 16:56   Resp 19 06/26/25 16:56   SpO2 96 % 06/26/25 16:56   Vitals shown include unfiled device data.        Patient Location: PACU    Anesthesia Type: general    Airway Patency: patent    Postop Pain Control: adequate    Mental Status: mildly sedated but able to meaningfully participate in the post-anesthesia evaluation    Nausea/Vomiting: none    Cardiopulmonary/Hydration status: stable euvolemic    Complications: no apparent anesthesia related complications    Postop vital signs: stable    Dental Exam: Unchanged from Preop    Patient to be discharged from PACU when criteria met.

## 2025-06-26 NOTE — DISCHARGE INSTRUCTIONS
Home Care Instructions  Appendectomy  Dr. Dayana Eddy    Besides what was prescribed for pain control, the  patient can take tylenol 1g three times a day and ibuprofen 400-600mg in between up to 4 times a day as needed for pain as well.  The patient can also take miralax or colace as needed for constipation. Please ask your surgeon before resuming blood thinners such as aspirin, Plavix or Coumadin.  All other home medications may be resumed as scheduled. With antibiotics, please watch for rash, facial swelling or severe diarrhea.     DIET  The patient may resume a general diet immediately.  This is not a good time to eat excessively.  The patient should eat in moderation and stick with foods the patient feels are easy to digest.  There should be no alcohol consumption in the immediate recover time period or within six hours of taking narcotics.    WOUND CARE  The top dressing may be removed the day after surgery.  This includes the gauze, tape and band-aids if they are present.  Do not remove the steri-strips or butterfly tapes that are white and adherent to the skin.  The steri-strips will eventually peel up at the ends and at this point they may be removed.  This is usually seven to ten days after surgery.  The patient may shower the day after surgery.  There is no need to cover the incisions and all top gauze type dressings should be removed prior to showering.  Soap can get on the wounds but do not scrub over the wounds.  No hair dye or chemicals of any kind should get in the wounds.  Avoid tub baths for two weeks.  If visible sutures or staples are present they will be removed in the office by the surgeon or nurse.  Most wounds will be closed with dissolving suture underneath the skin.  These sutures will dissolve on their own.    ACTIVITY  Every day the patient should be up, showered and dressed.  Each day the patient should be up and around the house.  The patient should not lie in bed and should not stay  in [facility].  We count on the patient being up, coughing, walking and deep breathing to avoid pneumonia and blood clots in the legs.  Once a day the patient should get out of the house and go shopping, go to the mall, the hardware store, the movies or a restaurant.  The patient may ride in a car but should not drive the car for at least one week.  Patients should be off narcotics for at least 8 hours prior to driving.  The average time off work is 10 to 14 days; most adults will be seeing the surgeon prior to returning to work.  Students will return to school within 1-5 days after discharge from the hospital but will be off gym, sports, and indoors for recess for one month.  Patients may go up and down stairs and lift up to five pounds but no bending, pushing or pulling.  Patients should do nothing called work or exercise until the follow up visit.  Patients should seek further activity limits at the time of their appointment.    APPOINTMENT  Please call our office for an appointment within five to ten days of discharge.  Any fever greater than 100.5, chills, nausea, vomiting, or severe diarrhea please call our office.  If the wound turns red, hot, swollen, becomes increasingly painful, or drains pus call us immediately at [phone].  For life threatening emergencies call [phone].  For non-emergent care please call our office after 8:30 a.m. Monday through Friday.  The number listed above is our office number.  Our phone automatically switches to our answering service if we are not there.  Please do not use the emergency room for non-urgent care.    Thank you for entrusting us with your care.  [facility]--General Surgery

## 2025-06-26 NOTE — PLAN OF CARE
Assumed care of patient at 0700  PRN Dilaudid for pain  Scheduled tylenol for pain  Mg replaced per protocol  IVF and IV abx  NPO for lap appendectomy  Consent forms signed  Patient taken by transport for OR with all belongings and family    Addendum: Patient initially cleared by Gen [facility]x to discharge from PACU but patient requested to stay overnight for pain control. Gen Sx PA notified by PACU and gave okay for patient to stay overnight. Lap sites x3, C/D/I. Denies nausea. PRN Dilaudid given for pain but patient educated that we would be transitioning to PO pain medication. Family instructed on visiting hours and importance for patient to rest.

## 2025-06-26 NOTE — ANESTHESIA PREPROCEDURE EVALUATION
PRE-OP EVALUATION    Patient Name: Misha Lux    Admit Diagnosis: Other acute appendicitis [K35.890]    Pre-op Diagnosis: Acute appendicitis [K35.80]    LAPAROSCOPIC APPENDECTOMY    Anesthesia Procedure: LAPAROSCOPIC APPENDECTOMY (Abdomen)    Surgeons and Role:     * Dayana Eddy MD - Primary    Pre-op vitals reviewed.  Temp: 98 °F (36.7 °C)  Pulse: 53  Resp: 16  BP: 124/66  SpO2: 97 %  Body mass index is 26.39 kg/m².    Current medications reviewed.  Hospital Medications:  Current Medications[1]    Outpatient Medications:   Prescriptions Prior to Admission[2]    Allergies: Patient has no known allergies.      Anesthesia Evaluation    Patient summary reviewed.    Anesthetic Complications  (-) history of anesthetic complications         GI/Hepatic/Renal    Negative GI/hepatic/renal ROS.                             Cardiovascular    Negative cardiovascular ROS.    Exercise tolerance: good     MET: >4                                           Endo/Other    Negative endo/other ROS.                              Pulmonary    Negative pulmonary ROS.                       Neuro/Psych    Negative neuro/psych ROS.                          Acute appendicitis        Past Surgical History[3]  Social Hx on file[4]  History   Drug Use Unknown     Available pre-op labs reviewed.  Lab Results   Component Value Date    WBC 6.2 06/26/2025    RBC 5.02 06/26/2025    HGB 14.8 06/26/2025    HCT 43.2 06/26/2025    MCV 86.1 06/26/2025    MCH 29.5 06/26/2025    MCHC 34.3 06/26/2025    RDW 12.4 06/26/2025    .0 06/26/2025     Lab Results   Component Value Date     06/26/2025    K 3.9 06/26/2025     06/26/2025    CO2 28.0 06/26/2025    BUN 8 (L) 06/26/2025    CREATSERUM 1.06 06/26/2025    GLU 88 06/26/2025    CA 8.7 06/26/2025            Airway      Mallampati: II  Mouth opening: >3 FB  TM distance: 4 - 6 cm  Neck ROM: full Cardiovascular      Rhythm: regular  Rate: normal     Dental    Dentition  appears grossly intact         Pulmonary      Breath sounds clear to auscultation bilaterally.               Other findings              ASA: 1   Plan: general  NPO status verified and patient meets guidelines.    Post-procedure pain management plan discussed with surgeon and patient.      Plan/risks discussed with: patient, father and mother (Risks of general anesthesia discussed with patient, including nausea/vomiting, sore throat, dental injury, aspiration, allergic reaction, and cardiopulmonary compromise. All of their questions were answered and they are in agreement with the plan.)                Present on Admission:  **None**             [1]    [COMPLETED] magnesium sulfate in sterile water for injection 2 g/50mL IVPB premix 2 g  2 g Intravenous Once    [COMPLETED] sodium chloride 0.9 % IV bolus 1,000 mL  1,000 mL Intravenous Once    [COMPLETED] ondansetron (Zofran) 4 MG/2ML injection 4 mg  4 mg Intravenous Once    [COMPLETED] HYDROmorphone (Dilaudid) 1 MG/ML injection 0.5 mg  0.5 mg Intravenous Once    [COMPLETED] iopamidol 76% (ISOVUE-370) injection for power injector  100 mL Intravenous ONCE PRN    [COMPLETED] cefTRIAXone (Rocephin) 1 g in sodium chloride 0.9% 100 mL IVPB-ADDV  1 g Intravenous Once    [] dextrose 5%-sodium chloride 0.45% infusion   Intravenous Continuous    sodium chloride 0.9% infusion   Intravenous Continuous    [Held by provider] enoxaparin (Lovenox) 40 MG/0.4ML SUBQ injection 40 mg  40 mg Subcutaneous Daily    acetaminophen (Tylenol Extra Strength) tab 1,000 mg  1,000 mg Oral Q8H HETAL    oxyCODONE immediate release tab 5 mg  5 mg Oral Q4H PRN    Or    oxyCODONE immediate release tab 10 mg  10 mg Oral Q4H PRN    HYDROmorphone (Dilaudid) 1 MG/ML injection 0.4 mg  0.4 mg Intravenous Q2H PRN    Or    HYDROmorphone (Dilaudid) 1 MG/ML injection 0.8 mg  0.8 mg Intravenous Q2H PRN    melatonin tab 3 mg  3 mg Oral Nightly PRN    polyethylene glycol (PEG 3350) (Miralax) 17 g oral packet  17 g  17 g Oral Daily PRN    sennosides (Senokot) tab 17.2 mg  17.2 mg Oral Nightly PRN    bisacodyl (Dulcolax) 10 MG rectal suppository 10 mg  10 mg Rectal Daily PRN    fleet enema (Fleet) rectal enema 133 mL  1 enema Rectal Once PRN    ondansetron (Zofran) 4 MG/2ML injection 4 mg  4 mg Intravenous Q6H PRN    prochlorperazine (Compazine) 10 MG/2ML injection 5 mg  5 mg Intravenous Q8H PRN    cefTRIAXone (Rocephin) 2 g in sodium chloride 0.9% 100mL IVPB-DANNA  2 g Intravenous Q24H    metroNIDAZOLE in sodium chloride 0.79% (Flagyl) 5 mg/mL IVPB premix 500 mg  500 mg Intravenous Q12H   [2]   No medications prior to admission.   [3] History reviewed. No pertinent surgical history.  [4]   Social History  Socioeconomic History    Marital status: Single   Tobacco Use    Smoking status: Never    Smokeless tobacco: Never   Vaping Use    Vaping status: Every Day   Substance and Sexual Activity    Alcohol use: Never    Drug use: Never

## 2025-06-26 NOTE — ANESTHESIA PROCEDURE NOTES
Airway  Date/Time: 6/26/2025 3:49 PM  Reason: elective      General Information and Staff   Patient location during procedure: OR  Anesthesiologist: Ray Valadez MD  Performed: anesthesiologist   Performed by: Ray Valadez MD  Authorized by: Ray Valadez MD        Indications and Patient Condition  Indications for airway management: anesthesia  Sedation level: deep      Preoxygenated: yesPatient position: sniffing    Mask difficulty assessment: 1 - vent by mask    Final Airway Details    Final airway type: endotracheal airway    Successful airway: ETT  Cuffed: yes   Successful intubation technique: direct laryngoscopy  Endotracheal tube insertion site: oral  Blade: Rogerio  Blade size: #3  ETT size (mm): 7.5    Cormack-Lehane Classification: grade I - full view of glottis  Placement verified by: capnometry   Measured from: lips  Number of attempts at approach: 1    Additional Comments  No airway trauma during intubation

## 2025-06-26 NOTE — H&P
[facility]  History & Physical    Misha Lux Patient Status:  Observation    2000 MRN [id number]   Location [facility] 0SW-A Attending Lalo Avilez MD   Hosp Day # 0 PCP No primary care provider on file.     Reason for Admission:  Acute appendicitis     History of Present Illness:  Misha Lux is a(n) 24 year old male with no significant past medical history who presented to [place] emergency department with concerns of right lower quadrant abdominal pain that began suddenly around 2 pm that day. He denies nausea or vomiting. He denies diarrhea or constipation. He denies fever or chills.    Upon presentation to the emergency department he was afebrile and hemodynamically stable. Laboratory workup revealed no leukocytosis, Wbc 6.1, hemoglobin 16.5, platelets 183,000. CMP revealed no gross electrolyte abnormalities, no acute kidney injury, creatinine 1.12. No elevation in LFTs. Lipase 25. UA negative for UTI. CT of the abdomen and pelvis revealed 12 mm appendix with wall thickening diffusely consistent with appendicitis. Mild stranding around the appendix. No abscess or free air.     He has no significant past medical history. He reports no previous abdominal surgeries. He reports he does not take any blood thinning medication.     History:  Past Medical History[1]  Past Surgical History[2]  Family History[3]   reports that he has never smoked. He has never used smokeless tobacco. He reports that he does not drink alcohol and does not use drugs.    Allergies:  Allergies[4]    Home Medications:  Prescriptions Prior to Admission[5]    Review of Systems:    Allergic/Immuno:  Patient has no known allergies.  Cardiovascular:  Negative for cool extremity and irregular heartbeat/palpitations  Constitutional:  Negative for decreased activity, fever, irritability and lethargy  Endocrine:  Negative for abnormal sleep patterns, increased activity, polydipsia and  polyphagia  ENMT:  Negative for ear drainage, hearing loss and nasal drainage  Eyes:  Negative for eye discharge and vision loss  Gastrointestinal:  Positive for abdominal pain. Negative for constipation, decreased appetite, diarrhea and vomiting  Genitourinary:  Negative for dysuria and hematuria  Hema/Lymph:  Negative for easy bleeding and easy bruising  Integumentary:  Negative for pruritus and rash  Musculoskeletal:  Negative for bone/joint symptoms  Neurological:  Negative for gait disturbance  Psychiatric:  Negative for inappropriate interaction and psychiatric symptoms  Respiratory:  Negative for cough, dyspnea and wheezing    Physical Exam:   General: Awake, Alert,   Cooperative.  No apparent distress.  Vital Signs:  Blood pressure 112/76, pulse 52, temperature 97.4 °F (36.3 °C), temperature source Oral, resp. rate 18, height 73\", weight 200 lb (90.7 kg), SpO2 96%.  HEENT: Exam is unremarkable.  Without scleral icterus.  Mucous membranes are moist.  Neck:  Full range of motion to flexion and extension, lateral rotation and lateral flexion of cervical spine.  No JVD. Supple.   Lungs: No respiratory distress, effort normal.   Abdomen:  Soft, non-distended, mildly tender to palpation to right lower quadrant, with no rebound or guarding.  No peritoneal signs.     Extremities:  No lower extremity edema noted.  Without clubbing or cyanosis.    Skin: Normal texture and turgor.  Lymphatic:  No palpable cervical lymphadenopathy.  Neurologic: Cranial nerves are grossly intact.  Motor strength and sensory examination is grossly normal.  No focal neurologic deficit.    Laboratory Data:  Lab Results   Component Value Date    WBC 6.2 06/26/2025    HGB 14.8 06/26/2025    HCT 43.2 06/26/2025    .0 06/26/2025     No results found for: \"PT\", \"INR\"  Lab Results   Component Value Date     06/26/2025    K 3.9 06/26/2025     06/26/2025    CO2 28.0 06/26/2025    BUN 8 06/26/2025    CREATSERUM 1.06 06/26/2025     GLU 88 06/26/2025    CA 8.7 06/26/2025    ALKPHO 52 06/25/2025    ALT 28 06/25/2025    AST 14 06/25/2025    BILT 0.6 06/25/2025    ALB 4.5 06/25/2025    TP 6.8 06/25/2025         Impression and Plan:  Problem List[6]    Acute appendicitis    Plan:  The patient was discussed with Dr. Eddy who recommends patient undergo laparoscopic appendectomy; possible open. He has been added on to her operating schedule for today.  Continue NPO pending procedure  Analgesics and antiemetics as needed  Encourage ambulation and up to chair  Continue IV antibiotics- will discharge home on course of oral antibiotics  Continue IV fluids  Expected postoperative course discussed with the patient.   DVT prophylaxis with- lovenox on hold this AM pre-procedure      Sierra Tyler PA-C  6/26/2025  7:56 AM    Is this a shared or split note between Advanced Practice Provider and Physician? Yes       The patient was independently interviewed and examined by myself.  More than 50% of clinical time and 100% MDM was performed by myself.  I have reviewed the serology and imaging myself from this admission.  I do concur with the interpretation of the radiologist except were noted.  I personally performed the services described in this documentation and I agree with the assessment and plan as set forth above and discussed with the physician assistant.       24-year-old gentleman who presents to the emergency department yesterday with complaints of right lower quadrant abdominal pain.  The patient is being interviewed with his brother at the bedside.  Misha he began to develop periumbilical abdominal discomfort which subsequently localized down to the right lower quadrant.  The patient reports the pain starting fairly suddenly.  He denies any prior history of GI symptoms or pain of this nature.  Reports that yesterday afternoon the patient experienced anorexia and mild nausea but he denies any emesis.  He denies any diarrhea, fevers or chills.    Past  medical-surgical history-none.  Patient has not had any prior abdominal surgery.  He is not anticoagulated    On physical examination the patient's abdomen is soft, nondistended, focally tender to deep palpation in the right lower quadrant, positive Rovsing sign.  No evidence of guarding, rebound or percussion tenderness.    Workup in the ED reveals normal CBC, BMP also within normal limits except BUN mildly diminished at 8.  CT scan of the abdomen and pelvis reveals dilated appendix measuring 12 mm with wall thickening consistent with acute appendicitis.  Periappendiceal stranding is noted.  There is no evidence of perforation or abscess formation.    Treatment options were reviewed in detail with Misha and his brother.  We did discuss laparoscopic appendectomy for acute appendicitis.  At this time the patient and his brother wish to proceed with surgery today.    The risks, benefits, complications, possible outcomes and alternatives of the procedure were explained to the patient in detail.  Potential complications of this procedure and as with any surgical procedure and anesthesia were reviewed including but not limited to bleeding, infection, thromboembolic event, pneumonia were discussed.  Expected postoperative pain, recuperation and postoperative course and possible complications were also reviewed.  All questions from the patient were answered in detail.  The patient did verbalize understanding and does not have any further questions at this time.  The patient does wish to proceed with the proposed procedure.      TONY Eddy MD, FACS    Please note that this report has been produced using speech recognition software and may contain errors related to that system including but not limited to errors in grammar, punctuation and spelling as well as words and phrases that possibly may have been recognized inappropriately.  If there are any questions or concerns please contact the dictating provider for  clarification.  The 21st Century Cures Act makes medical notes like these available to patients in the interest of trans parency. Please be advised this is a medical document. Medical documents are intended to carry relevant information, facts as evident, and the clinical opinion of the practitioner. The medical note is intended as peer to peer communication and may appear blunt or direct. It is written in medical language and may contain abbreviations or verbiage that are unfamiliar.  If there are any questions or concerns please contact the dictating provider for clarification.         [1]   Past Medical History:   Visual impairment   [2] History reviewed. No pertinent surgical history.  [3] History reviewed. No pertinent family history.  [4] No Known Allergies  [5]   No medications prior to admission.   [6]   Patient Active Problem List  Diagnosis    Other acute appendicitis

## 2025-06-26 NOTE — PLAN OF CARE
PT ADMITTED A/O, C/O OF RLQ PAIN, GIVEN DILAUDID 0.4 MG, WHICH MADE HIM DIZZY, NPO, IVF, PLAN FOR OR TODAY    Problem: PAIN - ADULT  Goal: Verbalizes/displays adequate comfort level or patient's stated pain goal  Description: INTERVENTIONS:  - Encourage pt to monitor pain and request assistance  - Assess pain using appropriate pain scale  - Administer analgesics based on type and severity of pain and evaluate response  - Implement non-pharmacological measures as appropriate and evaluate response  - Consider cultural and social influences on pain and pain management  - Manage/alleviate anxiety  - Utilize distraction and/or relaxation techniques  - Monitor for opioid side effects  - Notify MD/LIP if interventions unsuccessful or patient reports new pain  - Anticipate increased pain with activity and pre-medicate as appropriate  Outcome: Progressing

## 2025-06-26 NOTE — ED QUICK NOTES
Orders for admission, patient is aware of plan and ready to go upstairs. Any questions, please call ED RN Deacon at extension [phone].     Patient Covid vaccination status: Unvaccinated     COVID Test Ordered in ED: None    COVID Suspicion at Admission: N/A    Running Infusions: Medication Infusions[1] None    Mental Status/LOC at time of transport: AAOx4    Other pertinent information:   CIWA score: N/A   NIH score:  N/A             [1]

## 2025-06-27 VITALS
HEART RATE: 66 BPM | TEMPERATURE: 98 F | SYSTOLIC BLOOD PRESSURE: 122 MMHG | HEIGHT: 73 IN | WEIGHT: 200 LBS | BODY MASS INDEX: 26.51 KG/M2 | DIASTOLIC BLOOD PRESSURE: 62 MMHG | RESPIRATION RATE: 16 BRPM | OXYGEN SATURATION: 94 %

## 2025-06-27 LAB — MAGNESIUM SERPL-MCNC: 1.9 MG/DL (ref 1.6–2.6)

## 2025-06-27 RX ORDER — METRONIDAZOLE 500 MG/1
500 TABLET ORAL 2 TIMES DAILY
Status: DISCONTINUED | OUTPATIENT
Start: 2025-06-27 | End: 2025-06-27

## 2025-06-27 RX ORDER — TRAMADOL HYDROCHLORIDE 50 MG/1
50 TABLET ORAL EVERY 6 HOURS PRN
Status: DISCONTINUED | OUTPATIENT
Start: 2025-06-27 | End: 2025-06-27

## 2025-06-27 RX ORDER — TRAMADOL HYDROCHLORIDE 50 MG/1
50 TABLET ORAL EVERY 6 HOURS PRN
Qty: 12 TABLET | Refills: 0 | Status: SHIPPED | OUTPATIENT
Start: 2025-06-27

## 2025-06-27 NOTE — OPERATIVE REPORT
[facility]  Operative Note    Misha Lux Location: OR   CSN [id number] MRN [id number]   Admission Date 6/25/2025 Operation Date 6/26/2025   Attending Physician Dayana Eddy MD Operating Physician Dayana Eddy MD     Date of procedure:   June 26, 2025    Pre-Operative Diagnosis: Acute appendicitis with localized peritonitis]    Indication for Surgery: Acute appendicitis    Post-Operative Diagnosis: Same as above     Procedure Performed: Laparoscopic appendectomy    Surgeons and Role:     * Dayana Eddy MD - Primary    Anesthesia: General    History of Present Illness:         24-year-old gentleman who presents to the emergency department yesterday with complaints of right lower quadrant abdominal pain.  The patient is being interviewed with his brother at the bedside.  Misha he began to develop periumbilical abdominal discomfort which subsequently localized down to the right lower quadrant.  The patient reports the pain starting fairly suddenly.  He denies any prior history of GI symptoms or pain of this nature.  Reports that yesterday afternoon the patient experienced anorexia and mild nausea but he denies any emesis.  He denies any diarrhea, fevers or chills.     Past medical-surgical history-none.  Patient has not had any prior abdominal surgery.  He is not anticoagulated     On physical examination the patient's abdomen is soft, nondistended, focally tender to deep palpation in the right lower quadrant, positive Rovsing sign.  No evidence of guarding, rebound or percussion tenderness.     Workup in the ED reveals normal CBC, BMP also within normal limits except BUN mildly diminished at 8.  CT scan of the abdomen and pelvis reveals dilated appendix measuring 12 mm with wall thickening consistent with acute appendicitis.  Periappendiceal stranding is noted.  There is no evidence of perforation or abscess formation.     Treatment options were reviewed in detail with Misha and   brother.  We did discuss laparoscopic appendectomy for acute appendicitis.  At this time the patient and his brother wish to proceed with surgery today.    Discussed with patient:  The potential treatment options, risks and benefits of the procedure were discussed in detail with the patient including but not limited to bleeding, infection, seroma/ hematoma formation, postoperative wound complications including development of a hernia, possible trocar injury and injury to other internal organs, possible removal of a normal appendix, possible need for a drain, possible conversion to open appendectomy, postoperative pneumonia, postoperative thromboembolic event including PE and DVT.  These and other potential intraoperative and perioperative risks were reviewed.  The patient states understanding and does not have any further questions at this time and does wish to proceed with surgery.    Summary of case: The patient was taken to the operating room and was placed on the OR table in the supine position. After the induction of general anesthesia perioperative antibiotics were given. The patient was prepped and draped in usual sterile fashion. Using local anesthesia a sherrill-umbilical incision was made and the anterior abdominal fascia was elevated with a Sheryl forcep.  The Veress needle was introduced into the abdomen and the abdomen was insufflated to 15 mmHg.  The Veress needle was exchanged for a 11 mm port. Under direct vision 2 accessory ports were placed without incidence. The patient was turned into a Trendelenburg position with the right side up.  The cecum was identified and this was swept medially to visualize the appendix.  The appendix was noted to be erythematous and indurated with diffuse fibrinous exudate.  There is no evidence of transmural necrosis, perforation or abscess formation.   The meso-appendix was grasped and elevated towards the anterior abdominal wall a small mesenteric defect was made and the  base of the appendix was stapled across and divided.  In a similar fashion the mesoappendix was also stapled across and divided. The appendix was placed into an Endopouch and was withdrawn through the suprapubic port site under direct vision. The trocar was then replaced.  The staple lines across the mesoappendix and base of the appendix was identified and found to be intact. There was no evidence of bleeding. The right lower quadrant and pelvis was copiously irrigated with antibiotic irrigation fluid until the irrigant was clear. The accessory ports were removed under direct vision with no evidence of bleeding from the anterior abdominal wall. The abdomen was deflated. The suprapubic and umbilical ports were closed with 0 Vicryl in a figure-of-eight fascial stitch. All skin incisions were closed with 4-0 Vicryl in a subcuticular manner Steri-Strips and sterile dressing were placed.  All sponge instrument and needle counts were correct at the conclusion of the procedure. The patient was extubated in the operating room and was taken to the recovery room in stable condition.  Postoperatively, intraoperative findings were discussed with with the patient's brother Leonides who also translated for the patient's mother and father who were present.  They have no further questions at this time.      EBL: Minimal    Pathologic Specimen: Appendix    Drains:  None      Dayana Eddy MD      Please note that this report has been produced using speech recognition software and may contain errors related to that system including but not limited to errors in grammar, punctuation and spelling as well as words and phrases that possibly may have been recognized inappropriately.  If there are any questions or concerns please contact the dictating provider for clarification.  The 21st Century Cures Act makes medical notes like these available to patients in the interest of transparency. Please be advised this is a medical document.  Medical documents are intended to carry relevant information, facts as evident, and the clinical opinion of the practitioner. The medical note is intended as peer to peer communication and may appear blunt or direct. It is written in medical language and may contain abbreviations or verbiage that are unfamiliar.  If there are any questions or concerns please contact the dictating provider for clarification.

## 2025-06-27 NOTE — PROGRESS NOTES
[facility]  Progress Note    Misha Lux Patient Status:  Observation    2000 MRN [id number]   Location [facility] 0SW-A Attending Dayana Eddy MD   Hosp Day # 0 PCP No primary care provider on file.     Subjective:  The patient was seen and examined at bedside.  He reports incisional site and shoulder pain.  He states his pain is not well-tolerated with oxycodone.  He reports some nausea yesterday.  He denies vomiting.  He has not passed flatus or had a bowel movement.    Objective/Physical Exam:  /53 (BP Location: Left arm)   Pulse 60   Temp 98 °F (36.7 °C) (Oral)   Resp 16   Ht 73\"   Wt 200 lb (90.7 kg)   SpO2 97%   BMI 26.39 kg/m²     Intake/Output Summary (Last 24 hours) at 2025 0747  Last data filed at 2025 0500  Gross per 24 hour   Intake 2300 ml   Output 710 ml   Net 1590 ml         General: Alert, oriented x3. No acute distress.  HEENT: Normocephalic, atraumatic. No scleral icterus.  Pulmonary: No respiratory distress, effort normal.   Abdomen: Non-distended, without tympany to percussion. Soft, appropriate incisional site tenderness to palpation. No rebound or guarding. No peritoneal signs.   Incision: Laparoscopic incision sites clean, dry, intact without surrounding cellulitis  Extremities: No lower extremity edema. No clubbing or cyanosis.   Skin: Warm, dry. No jaundice.       Labs:      No results found for: \"PT\", \"INR\"          Assessment:  Problem List[1]    Postop day 1 laparoscopic appendectomy    Plan:  Overall, the patient is doing well postoperatively.  Plan for discharge home today  Diet as tolerated  Antiemetics and analgesics as needed.  Will order tramadol for pain relief as oxycodone is not working for the patient.  Continue IV antibiotics.  Will transition to oral antibiotics at discharge  Encourage ambulation up to chair  Encourage incentive spirometer  DVT prophylaxis with Lovenox  Follow-up with [facility] general surgery in  approximately 2 weeks       My total face time with this patient was 25 minutes. Greater than half of the visit was spent in counseling the patient on the above listed diagnoses and treatment options.     Nati Patterson PA-C  6/27/2025  7:47 AM         [1]   Patient Active Problem List  Diagnosis    Other acute appendicitis    Acute appendicitis with localized peritonitis, without perforation, abscess, or gangrene

## 2025-06-27 NOTE — PLAN OF CARE
PT A/O, 96% ON RA, AFEBRILE, LAP SITES D/I, IVF INFUSING, IV ANTIBIOTICS, VOIDING, RATES PAIN AT 6, GIVEN OXYCODONE AND SCHEDULED TYLENOL WITH LITTLE RELIEF, PT REQUESTED DILAUDID x2, , TOLERATING DIET, ADVANCE IN AM, LABS    Problem: PAIN - ADULT  Goal: Verbalizes/displays adequate comfort level or patient's stated pain goal  Description: INTERVENTIONS:  - Encourage pt to monitor pain and request assistance  - Assess pain using appropriate pain scale  - Administer analgesics based on type and severity of pain and evaluate response  - Implement non-pharmacological measures as appropriate and evaluate response  - Consider cultural and social influences on pain and pain management  - Manage/alleviate anxiety  - Utilize distraction and/or relaxation techniques  - Monitor for opioid side effects  - Notify MD/LIP if interventions unsuccessful or patient reports new pain  - Anticipate increased pain with activity and pre-medicate as appropriate  Outcome: Progressing

## 2025-06-27 NOTE — PLAN OF CARE
Pt discharged home via wheelchair accompanied by family members, Prescriptions and discharge instructions given with pt verbalizing understanding.

## 2025-06-30 ENCOUNTER — PATIENT OUTREACH (OUTPATIENT)
Dept: CASE MANAGEMENT | Age: 25
End: 2025-06-30

## 2025-06-30 NOTE — PROGRESS NOTES
Voicemail received; Patient requesting assistance with scheduling appointment. Patient can be reached at [phone].     [facility] appointment request (discharged 06/27)    [facility]  [address] [place] [address] [place] [id number] [phone]

## 2025-06-30 NOTE — PROGRESS NOTES
Hospital Follow up for TCC (Discharge 6/27 edw)     Transitional Care Clinic   [facility]   [address], [address]  [phone]   Attempt 1: mail box not set up; unable to leave vm

## 2025-06-30 NOTE — PROGRESS NOTES
Hospital Follow up for TCC (Discharge 6/27 edw)      TCC Request :  Transitional Care Clinic   [facility]   [address], [address]  [phone]   Thursday 7/03 @3pm w/ Larissa ORTIZ    Gen Surg PA :  Follow up with [facility] Gen Surg Pa Follow up in 10-14 days, sooner if needed. [address] [facility] [address] [phone]  Tuesday 7/08 @2:30pm w/ Gen Surg PA        Spoke with pt ; TCC appt scheduled & confirmed : Gen Surg appt scheduled & per pt's request he will receieve his confirmation via his [employer] acct and requested that I not give a return call        Closing encounter

## 2025-07-01 ENCOUNTER — HOSPITAL ENCOUNTER (EMERGENCY)
Age: 25
Discharge: HOME | End: 2025-07-01
Attending: EMERGENCY MEDICINE
Payer: MEDICAID

## 2025-07-01 VITALS
HEIGHT: 73 IN | OXYGEN SATURATION: 96 % | RESPIRATION RATE: 18 BRPM | TEMPERATURE: 98 F | DIASTOLIC BLOOD PRESSURE: 82 MMHG | SYSTOLIC BLOOD PRESSURE: 129 MMHG | HEART RATE: 76 BPM | WEIGHT: 200 LBS | BODY MASS INDEX: 26.51 KG/M2

## 2025-07-01 DIAGNOSIS — L73.9 FOLLICULITIS: Primary | ICD-10-CM

## 2025-07-01 PROCEDURE — 99283 EMERGENCY DEPT VISIT LOW MDM: CPT

## 2025-07-01 RX ORDER — VARENICLINE TARTRATE 1 MG/1
1 TABLET, FILM COATED ORAL AS DIRECTED
COMMUNITY
Start: 2025-06-27 | End: 2025-07-03 | Stop reason: CLARIF

## 2025-07-01 RX ORDER — MUPIROCIN 2 %
1 OINTMENT (GRAM) TOPICAL 3 TIMES DAILY
Qty: 1 EACH | Refills: 0 | Status: SHIPPED | OUTPATIENT
Start: 2025-07-01 | End: 2025-07-08 | Stop reason: ALTCHOICE

## 2025-07-01 RX ORDER — SULFAMETHOXAZOLE AND TRIMETHOPRIM 800; 160 MG/1; MG/1
1 TABLET ORAL 2 TIMES DAILY
Qty: 10 TABLET | Refills: 0 | Status: SHIPPED | OUTPATIENT
Start: 2025-07-01 | End: 2025-07-06

## 2025-07-01 NOTE — ED INITIAL ASSESSMENT (HPI)
Patient here with rash/bumps to back of head at bottom of hairline. States it is itchy, burning pain. Denies fever.

## 2025-07-01 NOTE — ED PROVIDER NOTES
Patient Seen in: ward Emergency Department In [place]        History  Chief Complaint   Patient presents with    Rash     Stated Complaint: Rash back of head    Subjective:   HPI            24-year-old male.  Patient recently had his haircut.  He has now noted a papular rash along his occipital hairline.  Itches and burns      Objective:     Past Medical History:    Visual impairment              History reviewed. No pertinent surgical history.             Social History     Socioeconomic History    Marital status: Single    Number of children: 0   Occupational History    Occupation: /   Tobacco Use    Smoking status: Never    Smokeless tobacco: Never   Vaping Use    Vaping status: Every Day    Substances: Nicotine    Devices: Pre-filled or refillable cartridge, Refillable tank, Pre-filled pod   Substance and Sexual Activity    Alcohol use: Never    Drug use: Never   Social History Narrative    ** Merged History Encounter **          Social Drivers of Health     Food Insecurity: No Food Insecurity (6/25/2025)    NCSS - Food Insecurity     Worried About Running Out of Food in the Last Year: No     Ran Out of Food in the Last Year: No   Transportation Needs: No Transportation Needs (6/25/2025)    NCSS - Transportation     Lack of Transportation: No   Housing Stability: Not At Risk (6/25/2025)    NCSS - Housing/Utilities     Has Housing: Yes     Worried About Losing Housing: No     Unable to Get Utilities: No                                Physical Exam    ED Triage Vitals [07/01/25 1259]   /82   Pulse 76   Resp 18   Temp 98.2 °F (36.8 °C)   Temp src Oral   SpO2 96 %   O2 Device None (Room air)       Current Vitals:   Vital Signs  BP: 129/82  Pulse: 76  Resp: 18  Temp: 98.2 °F (36.8 °C)  Temp src: Oral    Oxygen Therapy  SpO2: 96 %  O2 Device: None (Room air)            Physical Exam     Gen: Well appearing, well groomed, alert and aware x 3  Lung: No distress, RR, no  retraction  Extremities: Full ROM, no deformity, NVI  Skin: Folliculitis to the nape along the patient's hairline.  No surrounding erythema or induration  Neuro:  Normal Gait      ED Course  Labs Reviewed - No data to display                      MDM     My supervising physician was involved in the management of this patient.    Patient has folliculitis along his nape hairline.  Possibly caused by his recent haircut.  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  Patient placed on mupirocin.        Medical Decision Making      Disposition and Plan     Clinical Impression:  1. Folliculitis         Disposition:  Discharge  7/1/2025  1:37 pm    Follow-up:  No follow-up provider specified.        Medications Prescribed:  Current Discharge Medication List        START taking these medications    Details   mupirocin 2 % External Ointment Apply 1 Application topically 3 (three) times daily for 14 days.  Qty: 1 each, Refills: 0      sulfamethoxazole-trimethoprim -160 MG Oral Tab per tablet Take 1 tablet by mouth 2 (two) times daily for 5 days.  Qty: 10 tablet, Refills: 0                   Supplementary Documentation:

## 2025-07-01 NOTE — DISCHARGE INSTRUCTIONS
Topical antibiotic 2-3 times daily.  Oral antibiotic as written.    Purchase over-the-counter Gas-X/simethicone.    Increase MiraLAX to twice daily

## 2025-07-03 ENCOUNTER — OFFICE VISIT (OUTPATIENT)
Dept: INTERNAL MEDICINE CLINIC | Facility: CLINIC | Age: 25
End: 2025-07-03
Payer: MEDICAID

## 2025-07-03 VITALS
BODY MASS INDEX: 27.76 KG/M2 | WEIGHT: 209.44 LBS | OXYGEN SATURATION: 97 % | RESPIRATION RATE: 16 BRPM | DIASTOLIC BLOOD PRESSURE: 70 MMHG | HEIGHT: 73 IN | TEMPERATURE: 98 F | SYSTOLIC BLOOD PRESSURE: 130 MMHG | HEART RATE: 82 BPM

## 2025-07-03 DIAGNOSIS — K35.30 ACUTE APPENDICITIS WITH LOCALIZED PERITONITIS, WITHOUT PERFORATION, ABSCESS, OR GANGRENE: ICD-10-CM

## 2025-07-03 DIAGNOSIS — Z90.49 S/P LAPAROSCOPIC APPENDECTOMY: ICD-10-CM

## 2025-07-03 DIAGNOSIS — R10.9 ACUTE ABDOMINAL PAIN: Primary | ICD-10-CM

## 2025-07-03 DIAGNOSIS — K59.03 DRUG-INDUCED CONSTIPATION: ICD-10-CM

## 2025-07-03 DIAGNOSIS — L73.9 FOLLICULITIS: ICD-10-CM

## 2025-07-03 RX ORDER — VARENICLINE TARTRATE 0.5 (11)-1
KIT ORAL AS DIRECTED
COMMUNITY
Start: 2025-05-27

## 2025-07-03 RX ORDER — IBUPROFEN 600 MG/1
600 TABLET, FILM COATED ORAL EVERY 6 HOURS PRN
COMMUNITY
Start: 2025-05-27

## 2025-07-03 NOTE — PROGRESS NOTES
TCM VISIT  [facility] TR[facility]IN[facility]      HISTORY   HPI: Misha Lux is a 25 year old male here today for hospital follow up for acute abdominal pain, acute appendicitis with localized peritonitis without perforation, abscess, or gangrene, s/p laparoscopic appendectomy, drug-induced constipation, folliculitis.  Misha Lux was discharged from [facility]  to Home or Self Care.  Admit Date: 7/1/25. Discharge Date: 7/1/25.     Hospital Discharge Diagnosis:      Acute abdominal pain  Acute appendicitis with localized peritonitis, without perforation, abscess, or gangrene  S/p laparoscopic appendectomy  Drug-induced constipation  Folliculitis    Hospital stay was complicated by return to ED on 7/1.  Misha Lux was discharge with Augmentin, tramadol, Bactrim, Bactroban, Gas-X, MiraLAX, postop restrictions, ibuprofen, Tylenol and a referral to the [facility] for continued follow up.      Today, patient is being seen for hospital follow-up.  He reports doing okay since discharge.  He is accompanied by his brother at time of exam.    He presented to ED with right lower quadrant abdominal pain that began suddenly around 2 PM on day of admission.  He denied any nausea/vomiting, diarrhea/constipation or fever/chills.  In ED he had CTAP that revealed a 12 mm appendix with wall thickening diffusely consistent with appendicitis with mild stranding around the appendix and no abscess or free air.  He was admitted with surgery on consult who recommended surgical intervention.  He underwent laparoscopic appendectomy without complications and was tolerating an oral diet, pain was controlled on oral medication, and patient was up ambulating without difficulties.  He was cleared for discharge and discharged home in stable condition.    He returned to ED on 7/1 with reports of rash to the back of his head.  He reported recently having his haircut and now had a noted papular  rash along his occipital hairline that itched and burned.  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  He was discharged home on course of Bactrim for 5 days as well as mupirocin ointment 3 times daily x 14 days.  He was cleared for discharge and discharged home in stable condition.    Interactive contact within 2 business days post discharge first initiated on Date: 7/3/2025      Allergies:  Allergies[1]   Current Meds:  Current Outpatient Medications   Medication Sig Dispense Refill    varenicline 1 MG Oral Tab Take 1 tablet (1 mg total) by mouth As Directed.      mupirocin 2 % External Ointment Apply 1 Application topically 3 (three) times daily for 14 days. 1 each 0    sulfamethoxazole-trimethoprim -160 MG Oral Tab per tablet Take 1 tablet by mouth 2 (two) times daily for 5 days. 10 tablet 0    traMADol 50 MG Oral Tab Take 1 tablet (50 mg total) by mouth every 6 (six) hours as needed. 12 tablet 0    ondansetron 4 MG Oral Tablet Dispersible Take 1 tablet (4 mg total) by mouth every 4 (four) hours as needed for Nausea. 7 tablet 0     No current facility-administered medications for this visit.       HISTORY: reconciled and reviewed with patient  Past Medical History[2]   Past Surgical History[3]   Family History[4]   Short Social Hx on File[5]     Imaging & Consults:  CT APPENDIX ABD/PEL W CONTRAST (CPT=74177)  Result Date: 6/25/2025  CONCLUSION: Findings of acute appendicitis without abscess. Findings discussed with Dr. Gamboa. Electronically Verified and Signed by Attending Radiologist: Michael Mata MD 6/25/2025 6:52 PM Workstation: [id number]      Lab Results   Component Value Date/Time    WBC 6.2 06/26/2025 06:52 AM    HGB 14.8 06/26/2025 06:52 AM    HCT 43.2 06/26/2025 06:52 AM    .0 06/26/2025 06:52 AM    GLU 88 06/26/2025 06:52 AM     06/26/2025 06:52 AM    K 3.9 06/26/2025 06:52 AM     06/26/2025 06:52 AM     CO2 28.0 06/26/2025 06:52 AM    BUN 8 (L) 06/26/2025 06:52 AM    CREATSERUM 1.06 06/26/2025 06:52 AM    CA 8.7 06/26/2025 06:52 AM    MG 1.9 06/27/2025 06:34 AM    ALB 4.5 06/25/2025 04:37 PM    ALT 28 06/25/2025 04:37 PM    AST 14 06/25/2025 04:37 PM       Immunization History   Administered Date(s) Administered    Influenza 09/22/2020    TDAP 10/06/2020       ROS:  GENERAL: energy fair/stable, denies fever, weakness  SKIN: denies any skin changes, + lap sites to abdomen x 2 with Steri-Strips, + lap sites x 1 to abdomen with glue closure, + papular rash along occipital hairline  EYES: denies blurred vision, eye pain  HEENT: denies ear pain, loss of hearing, sore throat  RESPIRATORY: denies cough, denies dyspnea with exertion  CARDIOVASCULAR: denies syncope, chest pain, fatigue, palpitations   GI: + Incisional abdominal pain, denies melena, + mild/resolving constipation, denies diarrhea, nausea, vomiting   MUSCULOSKELETAL: denies pain, states normal range of motion of extremities  NEUROLOGIC: denies confusion, balance difficulty  PSYCHIATRIC: denies depression or anxiety  HEMATOLOGIC: denies history of anemia, bruising, bleeding    PHYSICAL EXAM:  Vitals:    07/03/25 1453   BP: 130/70   Pulse: 82   Resp: 16   Temp: 97.9 °F (36.6 °C)       GENERAL: well developed, well nourished, in no apparent distress, good historian  INTEGUMENTARY: warm, dry, + lap sites to abdomen x 2 with Steri-Strips, + lap sites x 1 to abdomen with glue closure, + papular rash along occipital hairline  HEENT: atraumatic, normocephalic, sclera white, conjunctivae pink  NECK: supple  CHEST: no chest tenderness  LUNGS: clear to auscultation bilaterally, no wheezes, rhonchi or rales, normal respiratory effort  CARDIO: S1, S2, RRR without audible murmur  GI: + BS to all quadrants, no tenderness  MUSCULOSKELETAL: + ROM in all joints, no evidence of swelling, pain   EXTREMITIES: no cyanosis, or edema  NEURO: Oriented x3  PSYCHIATRIC: appropriate  affect    ASSESSMENT/ PLAN:   1. Acute abdominal pain/acute appendicitis with localized peritonitis without perforation, abscess, or gangrene/S/P laparoscopic appendectomy  CTAP showed 12 mm appendix with wall thickening diffusely that is consistent with appendicitis with mild stranding around the appendix with no abscess or free air  Was admitted with general surgery on consult and patient underwent laparoscopic appendectomy without complication  Denies any abdominal pain  Reports ongoing incisional pain  Rates incisional pain 2-4/10 before medications and is currently not taking any medications  Continue as needed for pain  Tramadol 50 mg every 6 hours as needed-currently not taking  Tylenol 500 mg 1-2 tabs every 6 hours as needed-currently not taking  Ibuprofen 600 mg every 6 hours as needed-will start taking  Lap sites x 2 to abdomen with Steri-Strips D/I and lap site x 1 above groin with glue closure D/I  Denies any nausea/vomiting or fever/chills  Recommended  Incentive spirometer 5-10 times per hour WA to prevent postoperative complications  Up ambulating 5-10 minutes every 30-60 minutes WA to prevent postoperative complications  While sitting do ankle pumps and foot circles  Abdominal binder placed for comfort  Discharged home on/completed  Augmentin 875-125 mg 1 tab 2 times daily for 5 days  All postop recommendations/restrictions discussed  Tolerating an oral diet  Appetite is good/drinking well  Moving bowels-straining/passing gas  Follow-up with general surgery PA on 7/8 at 2:30 PM  Established with PCP Dr. Ureña on 7/28 at 3 PM  All pertinent hospital/ED records, labs, radiology reviewed    2. Drug-induced constipation  Likely secondary to pain management postoperative in the hospital  Reports some straining with bowel movements  Also reports some bloating/gas  Continue  MiraLAX 17 g in 8 ounces of fluid daily as needed for laxative  Simethicone  mg up to 4 times daily as needed for  gas/bloating  Goal is to have soft formed stool every 1-3 days to prevent constipation  If no improvement in symptoms notify [facility]/PCP    3. Folliculitis  Returned to ED on 7/1 with papular rash along his occipital hairline that he developed after a recent haircut that burned and itched  Still has papular rash present along occipital hairline which occasionally itches but no longer burns  Reinforced discussing with mcpherson about cleaning tools in between clients to prevent infection  Discharged home on  Bactrim -160 mg 1 tab 2 times daily for 5 days  Mupirocin 2% external ointment apply topically to area 3 times daily x 14 days  If no improvement in symptoms notify [facility]/PCP      Orders Placed This Encounter    ibuprofen 600 MG Oral Tab     Sig: Take 1 tablet (600 mg total) by mouth every 6 (six) hours as needed for Pain.    Varenicline Tartrate, Starter, 0.5 MG X 11 & 1 MG X 42 Oral Tablet Therapy Pack     Sig: Take by mouth As Directed.           Medications & Refills for this Visit:  Current Medications[6]  Requested Prescriptions      No prescriptions requested or ordered in this encounter         Health Maintenance:  Health Maintenance   Topic Date Due    Annual Physical  Never done    HPV Vaccines (1 - Male 3-dose series) Never done    COVID-19 Vaccine (1 - 2024-25 season) Never done    Annual Depression Screening  Never done    Tobacco Cessation Counseling  Never done    Influenza Vaccine (1) 10/01/2025    DTaP,Tdap,and Td Vaccines (2 - Td or Tdap) 10/06/2030    Pneumococcal Vaccine: Birth to 50yrs  Aged Out    Meningococcal B Vaccine  Aged Out       Chronic Care Management Referral: N/A      Transitional Care Management Certification:  During the visit, I accessed [employer] and/or [employer] Everywhere and personally reviewed the following for the recent hospitalization: provider notes, consults, summaries, labs and other test results and the pertinent findings are documented below.     Medication Reconciliation:  I  am aware of an inpatient discharge within the last 30 days.  The discharge medication list has been reconciled with the patient's current medication list and reviewed by me.  See medication list for additions of new medication, and changes to current doses of medications and discontinued medications.        Discharge Disposition/Plan:     Future Appointments   Date Time Provider Department Center   7/8/2025  2:30 PM [id number]   7/28/2025  3:00 PM Margareth Ureña MD [facility] [address] [facility] [address]      1.  Transitional Care Clinic Visit: Next visit Discharged  2.  PCP Visit: 7/28/2025  3.  Chronic Care Management: N/A     DIANA Ivan, 7/3/2025  [place] [facility]  [phone]         [1] No Known Allergies  [2]   Past Medical History:   Visual impairment   [3] No past surgical history on file.  [4] No family history on file.  [5]   Social History  Socioeconomic History    Marital status: Single    Number of children: 0   Occupational History    Occupation: /   Tobacco Use    Smoking status: Never    Smokeless tobacco: Never   Vaping Use    Vaping status: Every Day    Substances: Nicotine    Devices: Pre-filled or refillable cartridge, Refillable tank, Pre-filled pod   Substance and Sexual Activity    Alcohol use: Never    Drug use: Never   Social History Narrative    ** Merged History Encounter **          Social Drivers of Health     Food Insecurity: No Food Insecurity (6/25/2025)    NCSS - Food Insecurity     Worried About Running Out of Food in the Last Year: No     Ran Out of Food in the Last Year: No   Transportation Needs: No Transportation Needs (6/25/2025)    NCSS - Transportation     Lack of Transportation: No   Housing Stability: Not At Risk (6/25/2025)    NCSS - Housing/Utilities     Has Housing: Yes     Worried About Losing Housing: No     Unable to Get Utilities: No   [6]    ibuprofen 600 MG Oral Tab Take 1 tablet (600 mg total) by mouth every 6  (six) hours as needed for Pain.      Varenicline Tartrate, Starter, 0.5 MG X 11 & 1 MG X 42 Oral Tablet Therapy Pack Take by mouth As Directed.      mupirocin 2 % External Ointment Apply 1 Application topically 3 (three) times daily for 14 days. 1 each 0    sulfamethoxazole-trimethoprim -160 MG Oral Tab per tablet Take 1 tablet by mouth 2 (two) times daily for 5 days. 10 tablet 0    traMADol 50 MG Oral Tab Take 1 tablet (50 mg total) by mouth every 6 (six) hours as needed. 12 tablet 0

## 2025-07-03 NOTE — PROGRESS NOTES
TRANSITIONAL CARE CLINIC PHARMACIST MEDICATION RECONCILIATION        Misha Lux MRN [id number]    2000 PCP Freddy Hernández MD       Comments: Medication history completed by the Transitional Care Clinic Pharmacist with the patient and brother in the office.     The following medication changes occurred while patient was hospitalized:  Medications Started:   Amoxicillin-Clavulanate 875-125mg tabs - 1 tablet two times daily (therapy to complete on 25)  Tramadol 50mg tabs - 1 tablet every 6 hours as needed    Outpatient Encounter Medications as of 7/3/2025   Medication Sig    ibuprofen 600 MG Oral Tab Take 1 tablet (600 mg total) by mouth every 6 (six) hours as needed for Pain.    Varenicline Tartrate, Starter, 0.5 MG X 11 & 1 MG X 42 Oral Tablet Therapy Pack Take by mouth As Directed.    mupirocin 2 % External Ointment Apply 1 Application topically 3 (three) times daily for 14 days.    sulfamethoxazole-trimethoprim -160 MG Oral Tab per tablet Take 1 tablet by mouth 2 (two) times daily for 5 days.    traMADol 50 MG Oral Tab Take 1 tablet (50 mg total) by mouth every 6 (six) hours as needed.     Medication Adherence Assessment:   Patient reports finishing the Amoxicillin-Clavulanate antibiotic as prescribed.  Denies any upset stomach or diarrhea.  Currently taking the SMZ-TMP DS tablets twice daily for the folliculitis.  Reports having some hard stools, but denies any diarrhea.  States he has taken the Miralax daily, but is asking about using a gentle laxative tablet instead.  Discussed with the patient that he can use either product, but if diarrhea develops he should stop taking.      Patient reports taking the Tramadol for the first couple of days after discharge.      Reviewed all medications in detail with patient including dose, indication, timing of administration, monitoring parameters, and potential side effects of medications.   Patient confirmed understanding.     Thank  you,    Salima Hampton, PharmD, 7/3/2025, 3:09 PM  Transitional Care Clinic

## 2025-07-05 PROBLEM — L73.9 FOLLICULITIS: Status: ACTIVE | Noted: 2025-07-05

## 2025-07-05 PROBLEM — K59.03 DRUG-INDUCED CONSTIPATION: Status: ACTIVE | Noted: 2025-07-05

## 2025-07-05 PROBLEM — Z90.49 S/P LAPAROSCOPIC APPENDECTOMY: Status: ACTIVE | Noted: 2025-07-05

## 2025-07-05 PROBLEM — R10.9 ACUTE ABDOMINAL PAIN: Status: ACTIVE | Noted: 2025-07-05

## 2025-07-08 ENCOUNTER — OFFICE VISIT (OUTPATIENT)
Facility: LOCATION | Age: 25
End: 2025-07-08
Payer: MEDICAID

## 2025-07-08 VITALS
OXYGEN SATURATION: 98 % | TEMPERATURE: 98 F | HEART RATE: 65 BPM | SYSTOLIC BLOOD PRESSURE: 134 MMHG | DIASTOLIC BLOOD PRESSURE: 69 MMHG

## 2025-07-08 DIAGNOSIS — Z98.890 POST-OPERATIVE STATE: Primary | ICD-10-CM

## 2025-07-08 PROCEDURE — 99024 POSTOP FOLLOW-UP VISIT: CPT

## 2025-07-08 RX ORDER — VARENICLINE TARTRATE 1 MG/1
TABLET, FILM COATED ORAL
COMMUNITY
Start: 2025-07-05

## 2025-07-08 NOTE — PROGRESS NOTES
Post Operative Visit Note       Active Problems  1. Post-operative state         Chief Complaint   Chief Complaint   Patient presents with    Post-Op     PO - LAPAROSCOPIC APPENDECTOMY W/ BCK 6/26- Patient states he is feeling good.               History of Present Illness   The patient presents today for postoperative visit following laparoscopic appendectomy on 6/26/2025 with Dr. Eddy.    Patient is doing well overall postoperatively and has no acute complaints at this time.  He denies abdominal pain, stating he no longer takes analgesics.  He is tolerating diet without nausea and vomiting.  He denies constipation and diarrhea.  He denies fever and chills.    Allergies  Misha has no known allergies.    Past Medical / Surgical / Social / Family History    The past medical and past surgical history have been reviewed by me today.     Past Medical History:    Visual impairment     Past Surgical History:   Procedure Laterality Date    Appendectomy         The family history and social history have been reviewed by me today.    History reviewed. No pertinent family history.  Social History     Socioeconomic History    Marital status: Single    Number of children: 0   Occupational History    Occupation: /   Tobacco Use    Smoking status: Former     Types: Cigarettes    Smokeless tobacco: Never   Vaping Use    Vaping status: Every Day    Substances: Nicotine    Devices: Pre-filled or refillable cartridge, Refillable tank, Pre-filled pod   Substance and Sexual Activity    Alcohol use: Never    Drug use: Never        Current Outpatient Medications:     varenicline 1 MG Oral Tab, , Disp: , Rfl:     ibuprofen 600 MG Oral Tab, Take 1 tablet (600 mg total) by mouth every 6 (six) hours as needed for Pain., Disp: , Rfl:     traMADol 50 MG Oral Tab, Take 1 tablet (50 mg total) by mouth every 6 (six) hours as needed. (Patient not taking: Reported on 7/8/2025), Disp: 12 tablet, Rfl: 0      Review of Systems  A 10  point Review of Systems has been completed by me today and is negative except as above in the HPI.    Physical Findings   /69 (BP Location: Left arm, Patient Position: Sitting, Cuff Size: adult)   Pulse 65   Temp 98.1 °F (36.7 °C) (Temporal)   SpO2 98%   Gen/psych: alert and oriented, cooperative, no apparent distress  Cardiovascular: regular rate  Respiratory: respirations unlabored, no wheeze  Abdominal: soft, non-tender, non-distended, no guarding/rebound  Incisions: Laparoscopic incisions are clean, dry, and intact x 3.  Steri-Strips were removed at this time.  Firmness beneath umbilical incision consistent with scar tissue formation.         Assessment/Plan  1. Post-operative state        The patient is doing well postoperatively.  Tylenol and Ibuprofen for pain as needed.  Discussed with patient the firmness beneath umbilical incision is consistent with scar tissue formation will soften with time.  Gently clean incisions with soap and water.  Advised patient to avoid submerging incisions underwater for an additional week.  Continue general diet as tolerated.  Continue no heavy lifting more than 15 lbs until 4 weeks past procedure date (7/24/2025).  Patient may engage in light physical activity such as walking, stationary cycling, and elliptical.  He should not engage in activity this that involve his abdominal muscles until the lifting restriction is lifted.  Pathology was discussed with the patient.  Return to work note was given to patient during visit, stating he may return to work with a lifting restriction until July 24th.  All questions and concerns were addressed.  Follow up as needed.         Orders Placed This Encounter    varenicline 1 MG Oral Tab        Imaging & Referrals   None    Follow Up  Return if symptoms worsen or fail to improve.    BARBARA Summers  [facility] General Surgery  7/8/2025  3:00 PM

## 2025-07-28 ENCOUNTER — OFFICE VISIT (OUTPATIENT)
Dept: FAMILY MEDICINE CLINIC | Facility: CLINIC | Age: 25
End: 2025-07-28
Payer: MEDICAID

## 2025-07-28 VITALS
WEIGHT: 206.13 LBS | TEMPERATURE: 97 F | RESPIRATION RATE: 16 BRPM | HEART RATE: 60 BPM | OXYGEN SATURATION: 98 % | SYSTOLIC BLOOD PRESSURE: 118 MMHG | BODY MASS INDEX: 27.32 KG/M2 | DIASTOLIC BLOOD PRESSURE: 70 MMHG | HEIGHT: 73 IN

## 2025-07-28 DIAGNOSIS — Z90.49 STATUS POST APPENDECTOMY: ICD-10-CM

## 2025-07-28 DIAGNOSIS — R53.83 FATIGUE, UNSPECIFIED TYPE: ICD-10-CM

## 2025-07-28 DIAGNOSIS — Z00.00 WELL ADULT EXAM: Primary | ICD-10-CM

## 2025-07-28 DIAGNOSIS — R11.0 NAUSEA: ICD-10-CM

## 2025-07-28 PROCEDURE — 99385 PREV VISIT NEW AGE 18-39: CPT | Performed by: STUDENT IN AN ORGANIZED HEALTH CARE EDUCATION/TRAINING PROGRAM

## 2025-07-28 NOTE — PROGRESS NOTES
Subjective:      Chief Complaint   Patient presents with    Establish Care    Post-Op     appendectomy     HISTORY OF PRESENT ILLNESS  HPI  HPI obtained per patient report.  Misha Lux is a pleasant 25 year old male presenting for follow-up after a recent appendectomy.     He underwent laparoscopic appendectomy 6/26/25. He reports that his RLQ abdominal pain has been improving since, but has not resolved. He is concerned because he also developed new nausea after his surgery. He denies any fever, chills, vomiting, stool changes, or any other associated symptoms.     He requests orders for annual labs as he also has chronic fatigue.    PAST PATIENT HISTORY  Past Medical History[1]  Past Surgical History[2]    CURRENT MEDICATIONS  Medications Taking[3]    HEALTH MAINTENANCE  Immunization History   Administered Date(s) Administered    Influenza 09/22/2020    TDAP 10/06/2020       ALLERGIES AND DRUG REACTIONS  Allergies[4]    Family History[5]  Short Social Hx on File[6]    Review of Systems   Gastrointestinal:  Positive for abdominal pain and nausea.   All other systems reviewed and are negative.         Objective:      /70   Pulse 60   Temp 97.3 °F (36.3 °C) (Temporal)   Resp 16   Ht 6' 1\" (1.854 m)   Wt 206 lb 2 oz (93.5 kg)   SpO2 98%   BMI 27.19 kg/m²   Body mass index is 27.19 kg/m².    Physical Exam  Vitals reviewed.   Constitutional:       General: He is not in acute distress.     Appearance: He is not ill-appearing, toxic-appearing or diaphoretic.   HENT:      Head: Normocephalic and atraumatic.   Eyes:      General: No scleral icterus.        Right eye: No discharge.         Left eye: No discharge.      Extraocular Movements: Extraocular movements intact.      Conjunctiva/sclera: Conjunctivae normal.      Pupils: Pupils are equal, round, and reactive to light.   Neck:      Thyroid: No thyroid mass, thyromegaly or thyroid tenderness.   Cardiovascular:      Rate and Rhythm: Normal  rate and regular rhythm.      Heart sounds: Normal heart sounds.   Pulmonary:      Effort: Pulmonary effort is normal.      Breath sounds: Normal breath sounds.   Abdominal:      General: Abdomen is flat. Bowel sounds are normal. There is no distension.      Palpations: Abdomen is soft. There is no mass.      Tenderness: There is abdominal tenderness (mild tenderness over RLQ). There is no guarding or rebound.      Hernia: No hernia is present.   Musculoskeletal:      Cervical back: Neck supple. No tenderness.      Right lower leg: No edema.      Left lower leg: No edema.   Lymphadenopathy:      Cervical: No cervical adenopathy.   Skin:     General: Skin is warm and dry.      Coloration: Skin is not jaundiced or pale.      Findings: No bruising, erythema, lesion or rash.      Comments: Laparoscopic incision sites C/D/I and healed appropriately   Neurological:      Mental Status: He is alert and oriented to person, place, and time.   Psychiatric:         Mood and Affect: Mood normal.            Assessment and Plan:      1. Well adult exam (Primary)  -     CBC With Differential With Platelet; Future; Expected date: 07/28/2025  -     Comp Metabolic Panel (14); Future; Expected date: 07/28/2025  -     Lipid Panel; Future; Expected date: 07/28/2025  -     Vitamin D; Future; Expected date: 07/28/2025  -     TSH W Reflex To Free T4; Future; Expected date: 07/28/2025  -     Vitamin B12; Future; Expected date: 07/28/2025  2. Fatigue, unspecified type  3. Nausea  -     SURGERY - INTERNAL  4. Status post appendectomy  -     SURGERY - INTERNAL    Return if symptoms worsen or fail to improve.    Annual  - lab orders discussed anad provided    Nausea  - developed after lap appendectomy per pt  - associated with residual RLQ pain  - recommended follow-up with his surgeon and provided referral     Patient verbalized understanding of assessment and recommendations. All questions and concerns were addressed.    Electronically signed  by Margareth Ureña MD       [1]   Past Medical History:   Visual impairment   [2]   Past Surgical History:  Procedure Laterality Date    Appendectomy     [3]   Outpatient Medications Marked as Taking for the 7/28/25 encounter (Office Visit) with Margareth Ureña MD   Medication Sig Dispense Refill    varenicline 1 MG Oral Tab       ibuprofen 600 MG Oral Tab Take 1 tablet (600 mg total) by mouth every 6 (six) hours as needed for Pain.     [4] No Known Allergies  [5] History reviewed. No pertinent family history.  [6]   Social History  Socioeconomic History    Marital status: Single    Number of children: 0   Occupational History    Occupation: /   Tobacco Use    Smoking status: Former     Types: Cigarettes    Smokeless tobacco: Never   Vaping Use    Vaping status: Former    Substances: Nicotine    Devices: Pre-filled or refillable cartridge, Refillable tank, Pre-filled pod   Substance and Sexual Activity    Alcohol use: Never    Drug use: Never   Social History Narrative    ** Merged History Encounter **          Social Drivers of Health     Food Insecurity: No Food Insecurity (6/25/2025)    NCSS - Food Insecurity     Worried About Running Out of Food in the Last Year: No     Ran Out of Food in the Last Year: No   Transportation Needs: No Transportation Needs (6/25/2025)    NCSS - Transportation     Lack of Transportation: No   Housing Stability: Not At Risk (6/25/2025)    NCSS - Housing/Utilities     Has Housing: Yes     Worried About Losing Housing: No     Unable to Get Utilities: No

## 2025-07-29 ENCOUNTER — LAB ENCOUNTER (OUTPATIENT)
Dept: LAB | Age: 25
End: 2025-07-29
Attending: STUDENT IN AN ORGANIZED HEALTH CARE EDUCATION/TRAINING PROGRAM

## 2025-07-29 DIAGNOSIS — Z00.00 WELL ADULT EXAM: ICD-10-CM

## 2025-07-29 LAB
ALBUMIN SERPL-MCNC: 4.5 G/DL (ref 3.2–4.8)
ALBUMIN/GLOB SERPL: 1.8 (ref 1–2)
ALP LIVER SERPL-CCNC: 57 U/L (ref 45–117)
ALT SERPL-CCNC: 18 U/L (ref 10–49)
ANION GAP SERPL CALC-SCNC: 11 MMOL/L (ref 0–18)
AST SERPL-CCNC: 12 U/L (ref ?–34)
BASOPHILS # BLD AUTO: 0.04 X10(3) UL (ref 0–0.2)
BASOPHILS NFR BLD AUTO: 0.7 %
BILIRUB SERPL-MCNC: 0.4 MG/DL (ref 0.3–1.2)
BUN BLD-MCNC: 11 MG/DL (ref 9–23)
CALCIUM BLD-MCNC: 9.5 MG/DL (ref 8.7–10.6)
CHLORIDE SERPL-SCNC: 105 MMOL/L (ref 98–112)
CHOLEST SERPL-MCNC: 148 MG/DL (ref ?–200)
CO2 SERPL-SCNC: 26 MMOL/L (ref 21–32)
CREAT BLD-MCNC: 1.09 MG/DL (ref 0.7–1.3)
EGFRCR SERPLBLD CKD-EPI 2021: 95 ML/MIN/1.73M2 (ref 60–?)
EOSINOPHIL # BLD AUTO: 0.24 X10(3) UL (ref 0–0.7)
EOSINOPHIL NFR BLD AUTO: 4.1 %
ERYTHROCYTE [DISTWIDTH] IN BLOOD BY AUTOMATED COUNT: 12.2 %
FASTING PATIENT LIPID ANSWER: YES
FASTING STATUS PATIENT QL REPORTED: YES
GLOBULIN PLAS-MCNC: 2.5 G/DL (ref 2–3.5)
GLUCOSE BLD-MCNC: 87 MG/DL (ref 70–99)
HCT VFR BLD AUTO: 46.5 % (ref 39–53)
HDLC SERPL-MCNC: 36 MG/DL (ref 40–59)
HGB BLD-MCNC: 16.1 G/DL (ref 13–17.5)
IMM GRANULOCYTES # BLD AUTO: 0.01 X10(3) UL (ref 0–1)
IMM GRANULOCYTES NFR BLD: 0.2 %
LDLC SERPL CALC-MCNC: 84 MG/DL (ref ?–100)
LYMPHOCYTES # BLD AUTO: 1.97 X10(3) UL (ref 1–4)
LYMPHOCYTES NFR BLD AUTO: 33.3 %
MCH RBC QN AUTO: 28.9 PG (ref 26–34)
MCHC RBC AUTO-ENTMCNC: 34.6 G/DL (ref 31–37)
MCV RBC AUTO: 83.3 FL (ref 80–100)
MONOCYTES # BLD AUTO: 0.59 X10(3) UL (ref 0.1–1)
MONOCYTES NFR BLD AUTO: 10 %
NEUTROPHILS # BLD AUTO: 3.06 X10 (3) UL (ref 1.5–7.7)
NEUTROPHILS # BLD AUTO: 3.06 X10(3) UL (ref 1.5–7.7)
NEUTROPHILS NFR BLD AUTO: 51.7 %
NONHDLC SERPL-MCNC: 112 MG/DL (ref ?–130)
OSMOLALITY SERPL CALC.SUM OF ELEC: 293 MOSM/KG (ref 275–295)
PLATELET # BLD AUTO: 186 10(3)UL (ref 150–450)
POTASSIUM SERPL-SCNC: 3.9 MMOL/L (ref 3.5–5.1)
PROT SERPL-MCNC: 7 G/DL (ref 5.7–8.2)
RBC # BLD AUTO: 5.58 X10(6)UL (ref 4.3–5.7)
SODIUM SERPL-SCNC: 142 MMOL/L (ref 136–145)
TRIGL SERPL-MCNC: 163 MG/DL (ref 30–149)
TSI SER-ACNC: 1.61 UIU/ML (ref 0.55–4.78)
VIT B12 SERPL-MCNC: 260 PG/ML (ref 211–911)
VIT D+METAB SERPL-MCNC: 38.9 NG/ML (ref 30–100)
VLDLC SERPL CALC-MCNC: 26 MG/DL (ref 0–30)
WBC # BLD AUTO: 5.9 X10(3) UL (ref 4–11)

## 2025-07-29 PROCEDURE — 80061 LIPID PANEL: CPT

## 2025-07-29 PROCEDURE — 80053 COMPREHEN METABOLIC PANEL: CPT

## 2025-07-29 PROCEDURE — 85025 COMPLETE CBC W/AUTO DIFF WBC: CPT

## 2025-07-29 PROCEDURE — 84443 ASSAY THYROID STIM HORMONE: CPT

## 2025-07-29 PROCEDURE — 36415 COLL VENOUS BLD VENIPUNCTURE: CPT

## 2025-07-29 PROCEDURE — 82306 VITAMIN D 25 HYDROXY: CPT

## 2025-07-29 PROCEDURE — 82607 VITAMIN B-12: CPT

## 2025-08-11 ENCOUNTER — OFFICE VISIT (OUTPATIENT)
Facility: LOCATION | Age: 25
End: 2025-08-11

## 2025-08-11 VITALS
DIASTOLIC BLOOD PRESSURE: 77 MMHG | SYSTOLIC BLOOD PRESSURE: 126 MMHG | OXYGEN SATURATION: 97 % | TEMPERATURE: 98 F | HEART RATE: 67 BPM

## 2025-08-11 DIAGNOSIS — Z98.890 POST-OPERATIVE STATE: Primary | ICD-10-CM

## (undated) DEVICE — SLEEVE COMPR MD KNEE LEN SGL USE KENDALL SCD

## (undated) DEVICE — NEPTUNE E-SEP SMOKE EVACUATION PENCIL, COATED, 70MM BLADE, PUSH BUTTON SWITCH: Brand: NEPTUNE E-SEP

## (undated) DEVICE — GLOVE SUR 6.5 SENSICARE PI PIP CRM PWD F

## (undated) DEVICE — POUCH SPECIMEN WIRE 6X3 250ML

## (undated) DEVICE — GRABBER GRASPER TIP, DISPOSABLE: Brand: RENEW

## (undated) DEVICE — HUNTER GASPER TIP, DISPOSABLE: Brand: RENEW

## (undated) DEVICE — SUT COAT VCRL+ 0 27IN UR-6 ABSRB VLT ANTIBACT

## (undated) DEVICE — POWER SHELL: Brand: SIGNIA

## (undated) DEVICE — COVER,LIGHT,CAMERA,HARD,1/PK,STRL: Brand: MEDLINE

## (undated) DEVICE — TRAY CATH 16FR F INCL BARDX IC COMPLT CARE

## (undated) DEVICE — 40580 - THE PINK PAD - ADVANCED TRENDELENBURG POSITIONING KIT: Brand: 40580 - THE PINK PAD - ADVANCED TRENDELENBURG POSITIONING KIT

## (undated) DEVICE — ARTICULATION RELOAD WITH TRI-STAPLE TECHNOLOGY: Brand: ENDO GIA

## (undated) DEVICE — ENDOPATH ULTRA VERESS INSUFFLATION NEEDLES WITH LUER LOCK CONNECTORS: Brand: ENDOPATH

## (undated) DEVICE — L-HOOK CAUTERY PROBE TIP, DISPOSABLE: Brand: RENEW

## (undated) DEVICE — MUCUS TRAP, 10FR, DELEE, W/VA: Brand: MEDLINE INDUSTRIES, INC.

## (undated) DEVICE — SYRINGE MED 10ML LL TIP W/O SFTY DISP

## (undated) DEVICE — BANDAGE,ADHESIVE,FABRIC,1"X3",STRL,LF: Brand: CURAD

## (undated) DEVICE — APPLICATOR STD 6IN COT TIP WOOD HNDL ST

## (undated) DEVICE — TROCAR: Brand: KII SHIELDED BLADED ACCESS SYSTEM

## (undated) DEVICE — DALE ABDOMINAL BINDER, 12" WIDE, STRETCHES TO FIT 30"-45", 1 PER BOX.: Brand: DALE ABDOMINAL BINDER

## (undated) DEVICE — UNDYED BRAIDED (POLYGLACTIN 910), SYNTHETIC ABSORBABLE SUTURE: Brand: COATED VICRYL

## (undated) DEVICE — PTFE COATED BLADE 2.75': Brand: MEDLINE

## (undated) DEVICE — LAP CHOLE/APPY CDS-LF: Brand: MEDLINE INDUSTRIES, INC.

## (undated) DEVICE — SOLUTION IRRIG 1000ML 0.9% NACL USP BTL

## (undated) DEVICE — Device: Brand: SUTURE PASSOR PRO

## (undated) DEVICE — TRADITIONAL MARYLAND DISSECTOR TIP, DISPOSABLE: Brand: RENEW

## (undated) NOTE — LETTER
January 2, 2025    Patient: Misha Lux   Date of Visit: 1/2/2025       To Whom It May Concern:    Misha Lux was seen and treated in our emergency department on 1/2/2025. He should not return to work until 1/3/20/25 or until fever free for 24 hours.     If you have any questions or concerns, please don't hesitate to call.       Encounter Provider(s):    Darcy Ga, DO

## (undated) NOTE — LETTER
June 27, 2025    Patient: Misha Lux   Date of Visit: 6/25/2025       To Whom It May Concern:    Misha Lux was seen and treated in our emergency department on 6/25/2025, discharged 6/27/2025. He should not return to work until seen for follow up next week.  No heavy lifting over 10 lbs, no driving until seen for follow up.     If you have any questions or concerns, please don't hesitate to call.       Encounter Provider(s):    Opal Alvarado MD Sugrue, Jeremy, MD Klade, Beatrice, MD

## (undated) NOTE — LETTER
[address]  [address]  Authorization for Surgical Operation and Procedure     Date:___________                                                                                                         Time:__________  I hereby authorize Surgeon(s):  Dayana Eddy MD, my physician and his/her assistants (if applicable), which may include medical students, residents, and/or fellows, to perform the following surgical operation/ procedure and administer such anesthesia as may be determined necessary by my physician:  Operation/Procedure name (s) laparoscopic appendectomy, possible open  on Misha Lux   2.   I recognize that during the surgical operation/procedure, unforeseen conditions may necessitate additional or different procedures than those listed above.  I, therefore, further authorize and request that the above-named surgeon, assistants, or designees perform such procedures as are, in their judgment, necessary and desirable.    3.   My surgeon/physician has discussed prior to my surgery the potential benefits, risks and side effects of this procedure; the likelihood of achieving goals; and potential problems that might occur during recuperation.  They also discussed reasonable alternatives to the procedure, including risks, benefits, and side effects related to the alternatives and risks related to not receiving this procedure.  I have had all my questions answered and I acknowledge that no guarantee has been made as to the result that may be obtained.    4.   Should the need arise during my operation/procedure, which includes change of level of care prior to discharge, I also consent to the administration of blood and/or blood products.  Further, I understand that despite careful testing and screening of blood or blood products by collecting agencies, I may still be subject to ill effects as a result of receiving a blood transfusion and/or blood  products.  The following are some, but not all, of the potential risks that can occur: fever and allergic reactions, hemolytic reactions, transmission of diseases such as Hepatitis, AIDS and Cytomegalovirus (CMV) and fluid overload.  In the event that I wish to have an autologous transfusion of my own blood, or a directed donor transfusion, I will discuss this with my physician.  Check only if Refusing Blood or Blood Products  I understand refusal of blood or blood products as deemed necessary by my physician may have serious consequences to my condition to include possible death. I hereby assume responsibility for my refusal and release the hospital, its personnel, and my physicians from any responsibility for the consequences of my refusal.          o  Refuse      5.   I authorize the use of any specimen, organs, tissues, body parts or foreign objects that may be removed from my body during the operation/procedure for diagnosis, research or teaching purposes and their subsequent disposal by hospital authorities.  I also authorize the release of specimen test results and/or written reports to my treating physician on the hospital medical staff or other referring or consulting physicians involved in my care, at the discretion of the Pathologist or my treating physician.    6.   I consent to the photographing or videotaping of the operations or procedures to be performed, including appropriate portions of my body for medical, scientific, or educational purposes, provided my identity is not revealed by the pictures or by descriptive texts accompanying them.  If the procedure has been photographed/videotaped, the surgeon will obtain the original picture, image, videotape or CD.  The hospital will not be responsible for storage, release or maintenance of the picture, image, tape or CD.    7.   I consent to the presence of a  or observers in the operating room as deemed necessary by my physician or  their designees.    8.   I recognize that in the event my procedure results in extended X-Ray/fluoroscopy time, I may develop a skin reaction.    9. If I have a Do Not Attempt Resuscitation (DNAR) order in place, that status will be suspended while in the operating room, procedural suite, and during the recovery period unless otherwise explicitly stated by me (or a person authorized to consent on my behalf). The surgeon or my attending physician will determine when the applicable recovery period ends for purposes of reinstating the DNAR order.  10. Patients having a sterilization procedure: I understand that if the procedure is successful the results will be permanent and it will therefore be impossible for me to inseminate, conceive, or bear children.  I also understand that the procedure is intended to result in sterility, although the result has not been guaranteed.   11. I acknowledge that my physician has explained sedation/analgesia administration to me including the risk and benefits I consent to the administration of sedation/analgesia as may be necessary or desirable in the judgment of my physician.    I CERTIFY THAT I HAVE READ AND FULLY UNDERSTAND THE ABOVE CONSENT TO OPERATION and/or OTHER PROCEDURE.    _________________________________________  __________________________________  Signature of Patient     Signature of Responsible Person         ___________________________________         Printed Name of Responsible Person           _________________________________                 Relationship to Patient  _________________________________________  ______________________________  Signature of Witness          Date  Time      Patient Name: Misha Adeline Lux     : 2000                 Printed: 2025     Medical Record #: [id number]                     Page 1 of 2                                    [address]  [address]    Consent for  Anesthesia    I, Misha Lux agree to be cared for by an anesthesiologist, who is specially trained to monitor me and give me medicine to put me to sleep or keep me comfortable during my procedure    I understand that my anesthesiologist is not an employee or agent of [facility] or [employer]. He or she works for [employer] [facility] [employer].    As the patient asking for anesthesia services, I agree to:  Allow the anesthesiologist (anesthesia doctor) to give me medicine and do additional procedures as necessary. Some examples are: Starting or using an “IV” to give me medicine, fluids or blood during my procedure, and having a breathing tube placed to help me breathe when I’m asleep (intubation). In the event that my heart stops working properly, I understand that my anesthesiologist will make every effort to sustain my life, unless otherwise directed by [facility] Do Not Resuscitate documents.  Tell my anesthesia doctor before my procedure:  If I am pregnant.  The last time that I ate or drank.  All of the medicines I take (including prescriptions, herbal supplements, and pills I can buy without a prescription (including street drugs/illegal medications). Failure to inform my anesthesiologist about these medicines may increase my risk of anesthetic complications.  If I am allergic to anything or have had a reaction to anesthesia before.  I understand how the anesthesia medicine will help me (benefits).  I understand that with any type of anesthesia medicine there are risks:  The most common risks are: nausea, vomiting, sore throat, muscle soreness, damage to my eyes, mouth, or teeth (from breathing tube placement).  Rare risks include: remembering what happened during my procedure, allergic reactions to medications, injury to my airway, heart, lungs, vision, nerves, or muscles and in extremely rare instances death.  My doctor has explained to me other choices available to me  for my care (alternatives).  Pregnant Patients (“epidural”):  I understand that the risks of having an epidural (medicine given into my back to help control pain during labor), include itching, low blood pressure, difficulty urinating, headache or slowing of the baby’s heart. Very rare risks include infection, bleeding, seizure, irregular heart rhythms and nerve injury.  Regional Anesthesia (“spinal”, “epidural”, & “nerve blocks”):  I understand that rare but potential complications include headache, bleeding, infection, seizure, irregular heart rhythms, and nerve injury.    I can change my mind about having anesthesia services at any time before I get the medicine.    _____________________________________________________________________________  Patient (or Representative) Signature/Relationship to Patient  Date   Time    _____________________________________________________________________________   Name (if used)    Language/Organization   Time    _____________________________________________________________________________  Anesthesiologist Signature     Date   Time  I have discussed the procedure and information above with the patient (or patient’s representative) and answered their questions. The patient or their representative has agreed to have anesthesia services.    _____________________________________________________________________________  Witness        Date   Time  I have verified that the signature is that of the patient or patient’s representative, and that it was signed before the procedure  Patient Name: Misha Lr Derik Lux     : 2000                 Printed: 2025     Medical Record #: [id number]                     Page 2 of 2

## (undated) NOTE — LETTER
Your patient was recently seen at the [facility] for a hospital follow-up visit. The visit note is attached. Please contact the clinic with any questions at [phone].    Thank you,  DIANA Ivan

## (undated) NOTE — LETTER
6/27/2025    To Whom It May Concern:    Please be advised that Misha Lux has been under my medical care. He can not return to work/school until seen for a follow up next week. He currently has the following limitations, No heavy lifting over 10 lbs, no driving until seen for follow up.      You may contact us at [phone] should you have any questions.     Sincerely,      MD Robyn De Guzman RN

## (undated) NOTE — LETTER
2025    Return to Work    Name: Misha Lux        : 2000    To Whom It May Concern,    Misha Lux had surgery on *** and is:    {Return to School/Work:8127}    The patient may return to work on ***.    If there are any further questions, regarding this patient's care, please contact the patient directly.    Sincerely,    Ricardo Guillaume

## (undated) NOTE — Clinical Note
[address]  [address]  Authorization for Surgical Operation and Procedure     Date:___________                                                                                                         Time:__________  1. I hereby authorize Surgeon(s):  Dayana Eddy MD, my physician and his/her assistants (if applicable), which may include medical students, residents, and/or fellows, to perform the following surgical operation/ procedure and administer such anesthesia as may be determined necessary by my physician:  Operation/Procedure name (s) Procedure(s):  LAPAROSCOPIC APPENDECTOMY on Misha Lux   2.   I recognize that during the surgical operation/procedure, unforeseen conditions may necessitate additional or different procedures than those listed above.  I, therefore, further authorize and request that the above-named surgeon, assistants, or designees perform such procedures as are, in their judgment, necessary and desirable.    3.   My surgeon/physician has discussed prior to my surgery the potential benefits, risks and side effects of this procedure; the likelihood of achieving goals; and potential problems that might occur during recuperation.  They also discussed reasonable alternatives to the procedure, including risks, benefits, and side effects related to the alternatives and risks related to not receiving this procedure.  I have had all my questions answered and I acknowledge that no guarantee has been made as to the result that may be obtained.    4.   Should the need arise during my operation/procedure, which includes change of level of care prior to discharge, I also consent to the administration of blood and/or blood products.  Further, I understand that despite careful testing and screening of blood or blood products by collecting agencies, I may still be subject to ill effects as a result of receiving a blood transfusion and/or blood  products.  The following are some, but not all, of the potential risks that can occur: fever and allergic reactions, hemolytic reactions, transmission of diseases such as Hepatitis, AIDS and Cytomegalovirus (CMV) and fluid overload.  In the event that I wish to have an autologous transfusion of my own blood, or a directed donor transfusion, I will discuss this with my physician.  Check only if Refusing Blood or Blood Products  I understand refusal of blood or blood products as deemed necessary by my physician may have serious consequences to my condition to include possible death. I hereby assume responsibility for my refusal and release the hospital, its personnel, and my physicians from any responsibility for the consequences of my refusal.          o  Refuse      5.   I authorize the use of any specimen, organs, tissues, body parts or foreign objects that may be removed from my body during the operation/procedure for diagnosis, research or teaching purposes and their subsequent disposal by hospital authorities.  I also authorize the release of specimen test results and/or written reports to my treating physician on the hospital medical staff or other referring or consulting physicians involved in my care, at the discretion of the Pathologist or my treating physician.    6.   I consent to the photographing or videotaping of the operations or procedures to be performed, including appropriate portions of my body for medical, scientific, or educational purposes, provided my identity is not revealed by the pictures or by descriptive texts accompanying them.  If the procedure has been photographed/videotaped, the surgeon will obtain the original picture, image, videotape or CD.  The hospital will not be responsible for storage, release or maintenance of the picture, image, tape or CD.    7.   I consent to the presence of a  or observers in the operating room as deemed necessary by my physician or  their designees.    8.   I recognize that in the event my procedure results in extended X-Ray/fluoroscopy time, I may develop a skin reaction.    9. If I have a Do Not Attempt Resuscitation (DNAR) order in place, that status will be suspended while in the operating room, procedural suite, and during the recovery period unless otherwise explicitly stated by me (or a person authorized to consent on my behalf). The surgeon or my attending physician will determine when the applicable recovery period ends for purposes of reinstating the DNAR order.  10. Patients having a sterilization procedure: I understand that if the procedure is successful the results will be permanent and it will therefore be impossible for me to inseminate, conceive, or bear children.  I also understand that the procedure is intended to result in sterility, although the result has not been guaranteed.   11. I acknowledge that my physician has explained sedation/analgesia administration to me including the risk and benefits I consent to the administration of sedation/analgesia as may be necessary or desirable in the judgment of my physician.    I CERTIFY THAT I HAVE READ AND FULLY UNDERSTAND THE ABOVE CONSENT TO OPERATION and/or OTHER PROCEDURE.    _________________________________________  __________________________________  Signature of Patient     Signature of Responsible Person         ___________________________________         Printed Name of Responsible Person           _________________________________                 Relationship to Patient  _________________________________________  ______________________________  Signature of Witness          Date  Time      Patient Name: Misha Adeline Lux     : 2000                 Printed: 2025     Medical Record #: [id number]                     Page 1 of 2                                  [address]  [address]    Consent for  Anesthesia    1. I, Misha Lux agree to be cared for by an anesthesiologist, who is specially trained to monitor me and give me medicine to put me to sleep or keep me comfortable during my procedure    I understand that my anesthesiologist is not an employee or agent of [facility] or [employer]. He or she works for [employer].    2. As the patient asking for anesthesia services, I agree to:  a. Allow the anesthesiologist (anesthesia doctor) to give me medicine and do additional procedures as necessary. Some examples are: Starting or using an “IV” to give me medicine, fluids or blood during my procedure, and having a breathing tube placed to help me breathe when I’m asleep (intubation). In the event that my heart stops working properly, I understand that my anesthesiologist will make every effort to sustain my life, unless otherwise directed by [facility] Do Not Resuscitate documents.  b. Tell my anesthesia doctor before my procedure:  i. If I am pregnant.  ii. The last time that I ate or drank.  iii. All of the medicines I take (including prescriptions, herbal supplements, and pills I can buy without a prescription (including street drugs/illegal medications). Failure to inform my anesthesiologist about these medicines may increase my risk of anesthetic complications.  iv. If I am allergic to anything or have had a reaction to anesthesia before.  3. I understand how the anesthesia medicine will help me (benefits).  4. I understand that with any type of anesthesia medicine there are risks:  a. The most common risks are: nausea, vomiting, sore throat, muscle soreness, damage to my eyes, mouth, or teeth (from breathing tube placement).  b. Rare risks include: remembering what happened during my procedure, allergic reactions to medications, injury to my airway, heart, lungs, vision, nerves, or muscles and in extremely rare instances death.  5. My doctor has  explained to me other choices available to me for my care (alternatives).  6. Pregnant Patients (“epidural”):  I understand that the risks of having an epidural (medicine given into my back to help control pain during labor), include itching, low blood pressure, difficulty urinating, headache or slowing of the baby’s heart. Very rare risks include infection, bleeding, seizure, irregular heart rhythms and nerve injury.  7. Regional Anesthesia (“spinal”, “epidural”, & “nerve blocks”):  I understand that rare but potential complications include headache, bleeding, infection, seizure, irregular heart rhythms, and nerve injury.    I can change my mind about having anesthesia services at any time before I get the medicine.    _____________________________________________________________________________  Patient (or Representative) Signature/Relationship to Patient  Date   Time    _____________________________________________________________________________   Name (if used)    Language/Organization   Time    _____________________________________________________________________________  Anesthesiologist Signature     Date   Time  I have discussed the procedure and information above with the patient (or patient’s representative) and answered their questions. The patient or their representative has agreed to have anesthesia services.    _____________________________________________________________________________  Witness        Date   Time  I have verified that the signature is that of the patient or patient’s representative, and that it was signed before the procedure  Patient Name: Misha Lr Derik Lux     : 2000                 Printed: 2025     Medical Record #: [id number]                     Page 2 of 2